# Patient Record
Sex: FEMALE | Race: WHITE | NOT HISPANIC OR LATINO | Employment: FULL TIME | URBAN - METROPOLITAN AREA
[De-identification: names, ages, dates, MRNs, and addresses within clinical notes are randomized per-mention and may not be internally consistent; named-entity substitution may affect disease eponyms.]

---

## 2018-02-14 ENCOUNTER — TRANSFERRED RECORDS (OUTPATIENT)
Dept: HEALTH INFORMATION MANAGEMENT | Facility: CLINIC | Age: 33
End: 2018-02-14

## 2018-02-14 LAB
HPV ABSTRACT: NORMAL
PAP-ABSTRACT: NORMAL

## 2019-03-27 NOTE — PROGRESS NOTES
[id number]    SUBJECTIVE:                                                   Paola Couch is a 34 year old female who presents to clinic today for the following health issue(s):  Patient presents with:  Consult: has been trying for pregnancy for 1.5 years, has increased bleeding and painful periods, painful IC. Has had an HSG for blocked tubes.      HPI: The patient is seen at this time for evaluation of progressive increase in her menses and dysmenorrhea.  She has never been pregnant.  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  Her Paps have been normal since.  She can now bleed up to 5 to 7 days with passage of clots.  She has pain with intercourse exercise full bladder and full colon.  She is not able to use tampons due to pain.      Patient's last menstrual period was 04/05/2019..   Patient is sexually active, No obstetric history on file..  Using none for contraception.    reports that she has quit smoking. She has never used smokeless tobacco.    STD testing offered?  Declined    Health maintenance updated:  yes    Today's PHQ-2 Score: No flowsheet data found.  Today's PHQ-9 Score:   PHQ-9 SCORE 4/8/2019   PHQ-9 Total Score 8     Today's RAUDEL-7 Score:   RAUDEL-7 SCORE 4/8/2019   Total Score 9       Problem list and histories reviewed & adjusted, as indicated.  Additional history: as documented.    There is no problem list on file for this patient.    History reviewed. No pertinent surgical history.   Social History     Tobacco Use     Smoking status: Former Smoker     Smokeless tobacco: Never Used   Substance Use Topics     Alcohol use: Yes      Problem (# of Occurrences) Relation (Name,Age of Onset)    Diabetes (2) Maternal Grandmother, Maternal Grandfather            Current Outpatient Medications   Medication Sig     amphetamine-dextroamphetamine (ADDERALL XR) 15 MG 24 hr capsule Take 15 mg by mouth     Ascorbic Acid (VITAMIN C) 500 MG CAPS      Calcium-Magnesium-Vitamin D 600-300-400  "LIQD      CALCIUM-MAGNESIUM-VITAMIN D PO      Coenzyme Q10 (COQ10 PO)      escitalopram (LEXAPRO) 5 MG tablet Take 5 mg by mouth     Omega-3 Fatty Acids (FISH OIL PO)      Prenatal Vit-Fe Fumarate-FA (PRENATAL VITAMIN PO)      No current facility-administered medications for this visit.      No Known Allergies    ROS:  12 point review of systems negative other than symptoms noted below.  Gastrointestinal: Abdominal Pain, Bloating, Blood in Stools, Diarrhea and Nausea  Genitourinary: Cramps, Heavy Bleeding with Period, Incontinence, Painful [place], Pelvic Pain and Spotting  Psychiatric: Anxiety    OBJECTIVE:     /76   Pulse 68   Ht 1.613 m (5' 3.5\")   Wt 112 kg (247 lb)   LMP 04/05/2019   BMI 43.07 kg/m    Body mass index is 43.07 kg/m .    Exam:  Constitutional:  Appearance: Well nourished, well developed alert, in no acute distress chest is clear to percussion auscultation all fields.  Cardiovascular exam within normal limits with normal S1-S2 no murmur.  Abdominal examination shows truncal obesity.  There are no scars.  Pelvic examination shows heavy menses with large clots at this time.  There is no bladder base tenderness.  The cervix is minimally tender on motion and the uterus is firm.  There is bilateral adnexal tenderness present.  Extremities show no edema.    In-Clinic Test Results: Hysterosalpingogram shows normal uterus with patent left fallopian tube.  A portion of the right fallopian tube opacifies but there is no spill of contrast on the right side. 3/14/2019      ASSESSMENT/PLAN:                                                        34-year-old patient with progressive menorrhagia and dysmenorrhea and extreme tenderness on pelvic examination both at the level of the cervix and the adnexa.  It appears that she may have a blocked right tube and has no history of PID or IUD use.  We have presented the option of further evaluation with ultrasound and/or laparoscopy with laser standby for " treatment of probable stage II-III endometriosis.  And ACOG brochure for laparoscopy was given to the patient.        Boyd Bowles MD  [facility] [place]

## 2019-04-05 ENCOUNTER — TELEPHONE (OUTPATIENT)
Dept: OBGYN | Facility: CLINIC | Age: 34
End: 2019-04-05

## 2019-04-05 NOTE — TELEPHONE ENCOUNTER
Pt questioning if she should cancel her appt for Monday w/ Dr BA.  Pt seeing dr for endometrosis- just got her period today. Advised the pt to call the clinic Monday am If she had a heavy flow. Will discuss with Dr BA.  Pt verbalized understanding.

## 2019-04-08 ENCOUNTER — TELEPHONE (OUTPATIENT)
Dept: OBGYN | Facility: CLINIC | Age: 34
End: 2019-04-08

## 2019-04-08 ENCOUNTER — OFFICE VISIT (OUTPATIENT)
Dept: OBGYN | Facility: CLINIC | Age: 34
End: 2019-04-08
Payer: COMMERCIAL

## 2019-04-08 VITALS
BODY MASS INDEX: 42.17 KG/M2 | HEART RATE: 68 BPM | DIASTOLIC BLOOD PRESSURE: 76 MMHG | WEIGHT: 247 LBS | SYSTOLIC BLOOD PRESSURE: 114 MMHG | HEIGHT: 64 IN

## 2019-04-08 DIAGNOSIS — N97.1 TUBAL OCCLUSION: ICD-10-CM

## 2019-04-08 DIAGNOSIS — E66.01 MORBID OBESITY (H): ICD-10-CM

## 2019-04-08 DIAGNOSIS — R10.2 PELVIC PAIN: Primary | ICD-10-CM

## 2019-04-08 PROCEDURE — 99204 OFFICE O/P NEW MOD 45 MIN: CPT | Performed by: OBSTETRICS & GYNECOLOGY

## 2019-04-08 RX ORDER — DEXTROAMPHETAMINE SACCHARATE, AMPHETAMINE ASPARTATE MONOHYDRATE, DEXTROAMPHETAMINE SULFATE AND AMPHETAMINE SULFATE 3.75; 3.75; 3.75; 3.75 MG/1; MG/1; MG/1; MG/1
15 CAPSULE, EXTENDED RELEASE ORAL EVERY MORNING
COMMUNITY
Start: 2019-03-20 | End: 2023-03-21

## 2019-04-08 RX ORDER — ESCITALOPRAM OXALATE 5 MG/1
10 TABLET ORAL EVERY MORNING
COMMUNITY
Start: 2019-03-20 | End: 2023-10-02 | Stop reason: DRUGHIGH

## 2019-04-08 RX ORDER — MULTIVIT-MIN/IRON/FOLIC ACID/K 18-600-40
CAPSULE ORAL
COMMUNITY
End: 2019-04-29

## 2019-04-08 ASSESSMENT — ANXIETY QUESTIONNAIRES
3. WORRYING TOO MUCH ABOUT DIFFERENT THINGS: SEVERAL DAYS
6. BECOMING EASILY ANNOYED OR IRRITABLE: SEVERAL DAYS
2. NOT BEING ABLE TO STOP OR CONTROL WORRYING: SEVERAL DAYS
7. FEELING AFRAID AS IF SOMETHING AWFUL MIGHT HAPPEN: MORE THAN HALF THE DAYS
5. BEING SO RESTLESS THAT IT IS HARD TO SIT STILL: SEVERAL DAYS
GAD7 TOTAL SCORE: 9
IF YOU CHECKED OFF ANY PROBLEMS ON THIS QUESTIONNAIRE, HOW DIFFICULT HAVE THESE PROBLEMS MADE IT FOR YOU TO DO YOUR WORK, TAKE CARE OF THINGS AT HOME, OR GET ALONG WITH OTHER PEOPLE: SOMEWHAT DIFFICULT
1. FEELING NERVOUS, ANXIOUS, OR ON EDGE: SEVERAL DAYS

## 2019-04-08 ASSESSMENT — MIFFLIN-ST. JEOR: SCORE: 1802.44

## 2019-04-08 ASSESSMENT — PATIENT HEALTH QUESTIONNAIRE - PHQ9
5. POOR APPETITE OR OVEREATING: MORE THAN HALF THE DAYS
SUM OF ALL RESPONSES TO PHQ QUESTIONS 1-9: 8

## 2019-04-08 NOTE — TELEPHONE ENCOUNTER
Type of surgery: LSC C02 LASER  Location of surgery: [facility] OR  Date and time of surgery: 4/30/2019 7:20am ARRIVAL 5:30am  Surgeon: Wilbur  Pre-Op Appt Date: 4/8/2019  Post-Op Appt Date: TBD   Packet sent out: HANDED 4/8/2019  Pre-cert/Authorization completed:  TBD  Date: 4/8/2019 Hamlet hernandez/Odell Hastings  Surgery Scheduler      Order Questions     Question Answer Comment   Procedure name(s) - multi select Laparoscopy with CO2 laser    Is this a multi surgeon case? No    Laterality N/A    Reason for procedure Pelvic pain    Location of Case: [facility] OR    Surgeon Procedure Time (incision to closure) in minutes (per procedure as applicable) 60    Note:  Surgical Case Time Needed (in minutes)   Patient Class (for admit prior to surgery, specify number of days in comments): Same day (hospital outpatient)    Why can t this outpatient surgery be done at the [facility] or [facility]? -    Anesthesia General    Vendor Needed? No

## 2019-04-08 NOTE — Clinical Note
Please abstract the following data from this visit with this patient into the appropriate field in Epic:Pap smear done on this date: 2/14/18 (approximately), by this group: [facility], results were normal, HPV negative. Per care everywhere.

## 2019-04-08 NOTE — NURSING NOTE
Please abstract the following data from this visit with this patient into the appropriate field in Epic:    Pap smear done on this date: 2/14/18 (approximately), by this group: [facility], results were normal, HPV negative. Per care everywhere.

## 2019-04-09 ASSESSMENT — ANXIETY QUESTIONNAIRES: GAD7 TOTAL SCORE: 9

## 2019-04-22 ENCOUNTER — OFFICE VISIT (OUTPATIENT)
Dept: OBGYN | Facility: CLINIC | Age: 34
End: 2019-04-22
Payer: COMMERCIAL

## 2019-04-22 ENCOUNTER — TRANSFERRED RECORDS (OUTPATIENT)
Dept: HEALTH INFORMATION MANAGEMENT | Facility: CLINIC | Age: 34
End: 2019-04-22

## 2019-04-22 VITALS
DIASTOLIC BLOOD PRESSURE: 68 MMHG | WEIGHT: 250.8 LBS | HEIGHT: 64 IN | BODY MASS INDEX: 42.82 KG/M2 | SYSTOLIC BLOOD PRESSURE: 114 MMHG

## 2019-04-22 DIAGNOSIS — M25.559 PAIN IN JOINT INVOLVING PELVIC REGION AND THIGH, UNSPECIFIED LATERALITY: Primary | ICD-10-CM

## 2019-04-22 PROCEDURE — 99213 OFFICE O/P EST LOW 20 MIN: CPT | Performed by: OBSTETRICS & GYNECOLOGY

## 2019-04-22 ASSESSMENT — MIFFLIN-ST. JEOR: SCORE: 1819.68

## 2019-04-22 NOTE — PROGRESS NOTES
SUBJECTIVE:                                                   Paola Couch is a 34 year old female who presents to clinic today for the following health issue(s):  Patient presents with:  Ultrasound: follow up for pelvic pain        HPI: The patient is seen at this time in follow-up of an ultrasound for progressive pelvic pain.  We are concerned about the possibility of endometriosis and possible pelvic adhesive disease with blockage of her right tube by hysterosalpingogram.  She denies any recent colds or fluids.      Patient's last menstrual period was 04/05/2019..   Patient is sexually active, No obstetric history on file..  Using none for contraception.    reports that she has quit smoking. She has never used smokeless tobacco.    STD testing offered?  Declined    Health maintenance updated:  yes    Today's PHQ-2 Score: No flowsheet data found.  Today's PHQ-9 Score:   PHQ-9 SCORE 4/8/2019   PHQ-9 Total Score 8     Today's RAUDEL-7 Score:   RAUDEL-7 SCORE 4/8/2019   Total Score 9       Problem list and histories reviewed & adjusted, as indicated.  Additional history: as documented.    Patient Active Problem List   Diagnosis     Morbid obesity (H)     No past surgical history on file.   Social History     Tobacco Use     Smoking status: Former Smoker     Smokeless tobacco: Never Used   Substance Use Topics     Alcohol use: Yes      Problem (# of Occurrences) Relation (Name,Age of Onset)    Diabetes (2) Maternal Grandmother, Maternal Grandfather            Current Outpatient Medications   Medication Sig     Acetaminophen (TYLENOL PO)      amphetamine-dextroamphetamine (ADDERALL XR) 15 MG 24 hr capsule Take 15 mg by mouth     Ascorbic Acid (VITAMIN C) 500 MG CAPS      Calcium-Magnesium-Vitamin D 600-300-400 LIQD      CALCIUM-MAGNESIUM-VITAMIN D PO      Coenzyme Q10 (COQ10 PO)      escitalopram (LEXAPRO) 5 MG tablet Take 5 mg by mouth     Omega-3 Fatty Acids (FISH OIL PO)      Prenatal Vit-Fe Fumarate-FA (PRENATAL  "VITAMIN PO)      No current facility-administered medications for this visit.      No Known Allergies    ROS:  12 point review of systems negative other than symptoms noted below.    OBJECTIVE:     /68   Ht 1.613 m (5' 3.5\")   Wt 113.8 kg (250 lb 12.8 oz)   LMP 04/05/2019   BMI 43.73 kg/m    Body mass index is 43.73 kg/m .    Exam:  Constitutional:  Appearance: Well nourished, well developed alert, in no acute distress  No Pelvic Exam performed     In-Clinic Test Results: Ultrasound from [facility] shows a uterus of 5.3 x 4.6 x 3.9 cm.  There are no fibroids.  The endometrial stripe is 13 mm thick and normal in appearance.  The right ovary is 3.1 x 2.4 x 1.9 and normal in appearance.  The left ovary is 2.9 x 2.3 x 2.2 cm and normal in appearance.  There is a small amount of clear pelvic free fluid.      ASSESSMENT/PLAN:                                                      Progressive pelvic pain.  Patient is cleared for a laparoscopy with CO2 laser for evaluation treatment of pelvic adhesive disease and possible endometriosis.  The risks and complications have been reviewed and accepted.            Boyd Bowles MD  [facility] [place]  "

## 2019-04-29 RX ORDER — MULTIVIT WITH MINERALS/LUTEIN
1000 TABLET ORAL DAILY
COMMUNITY
End: 2021-06-09

## 2019-04-29 RX ORDER — PHENAZOPYRIDINE HYDROCHLORIDE 200 MG/1
200 TABLET, FILM COATED ORAL ONCE
Status: CANCELLED | OUTPATIENT
Start: 2019-04-29 | End: 2019-04-29

## 2019-04-29 NOTE — H&P
Office Visit     4/22/2019  [facility] Boyd Mata MD       OB/Gyn   Pain in joint involving pelvic region and thigh, unspecified laterality       Dx   Ultrasound         Reason for Visit        Progress Notes              SUBJECTIVE:                                                   Paola Couch is a 34 year old female who presents to clinic today for the following health issue(s):  Patient presents with:  Ultrasound: follow up for pelvic pain           HPI: The patient is seen at this time in follow-up of an ultrasound for progressive pelvic pain.  We are concerned about the possibility of endometriosis and possible pelvic adhesive disease with blockage of her right tube by hysterosalpingogram.  She denies any recent colds or fluids.        Patient's last menstrual period was 04/05/2019..   Patient is sexually active, No obstetric history on file..  Using none for contraception.    reports that she has quit smoking. She has never used smokeless tobacco.     STD testing offered?  Declined     Health maintenance updated:  yes     Today's PHQ-2 Score: No flowsheet data found.  Today's PHQ-9 Score:   PHQ-9 SCORE 4/8/2019   PHQ-9 Total Score 8      Today's RAUDEL-7 Score:   RAUDEL-7 SCORE 4/8/2019   Total Score 9         Problem list and histories reviewed & adjusted, as indicated.  Additional history: as documented.         Patient Active Problem List   Diagnosis     Morbid obesity (H)     No past surgical history on file.   Social History           Tobacco Use     Smoking status: Former Smoker     Smokeless tobacco: Never Used   Substance Use Topics     Alcohol use: Yes       Problem (# of Occurrences) Relation (Name,Age of Onset)     Diabetes (2) Maternal Grandmother, Maternal Grandfather                   Current Outpatient Medications   Medication Sig     Acetaminophen (TYLENOL PO)       amphetamine-dextroamphetamine (ADDERALL XR) 15 MG 24 hr capsule Take 15 mg by mouth     Ascorbic Acid  "(VITAMIN C) 500 MG CAPS       Calcium-Magnesium-Vitamin D 600-300-400 LIQD       CALCIUM-MAGNESIUM-VITAMIN D PO       Coenzyme Q10 (COQ10 PO)       escitalopram (LEXAPRO) 5 MG tablet Take 5 mg by mouth     Omega-3 Fatty Acids (FISH OIL PO)       Prenatal Vit-Fe Fumarate-FA (PRENATAL VITAMIN PO)        No current facility-administered medications for this visit.       No Known Allergies     ROS:  12 point review of systems negative other than symptoms noted below.     OBJECTIVE:      /68   Ht 1.613 m (5' 3.5\")   Wt 113.8 kg (250 lb 12.8 oz)   LMP 04/05/2019   BMI 43.73 kg/m    Body mass index is 43.73 kg/m .     Exam:  Constitutional:  Appearance: Well nourished, well developed alert, in no acute distress  No Pelvic Exam performed      In-Clinic Test Results: Ultrasound from [facility] shows a uterus of 5.3 x 4.6 x 3.9 cm.  There are no fibroids.  The endometrial stripe is 13 mm thick and normal in appearance.  The right ovary is 3.1 x 2.4 x 1.9 and normal in appearance.  The left ovary is 2.9 x 2.3 x 2.2 cm and normal in appearance.  There is a small amount of clear pelvic free fluid.        ASSESSMENT/PLAN:                                                       Progressive pelvic pain.  Patient is cleared for a laparoscopy with CO2 laser for evaluation treatment of pelvic adhesive disease and possible endometriosis.  The risks and complications have been reviewed and accepted.                 Boyd Bowles MD  [facility]          Instructions      After Visit Summary (Printed 4/22/2019)         Additional Documentation     Vitals:    /68      Ht 1.613 m (5' 3.5\")      Wt 113.8 kg (250 lb 12.8 oz)      LMP 04/05/2019      BMI 43.73 kg/m       BSA 2.26 m              More Vitals      Flowsheets:    NICU VS,      Anthropometrics,      Vitals Reassessment         Encounter Info:    Billing Info,      History,      Allergies,      Detailed Report           Media     Orders - " Scan on 4/29/2019 9:58 AM: [facility] - Scan on 4/29/2019 9:58 AM: ULTRASOUND ORDER [facility]      AVS Reports     Date/Time Report Action User   4/22/2019  2:34 PM After Visit Summary Printed Regi Bridges     Encounter Information      Provider Department Encounter # Center   4/22/2019 2:45 PM Boyd Bowles MD  Ob/Gyn [id number] [facility]     Reviewed this Encounter      Medications Problems Allergies History   Boyd Bowles MD   Reviewed Family, Medical, Surgical, Tobacco   Zoraida Arreola CMA   Reviewed Tobacco       Orders Placed      None       Medication Changes          None        Medication List      Visit Diagnoses         Pain in joint involving pelvic region and thigh, unspecified laterality        Problem List

## 2019-04-30 ENCOUNTER — ANESTHESIA (OUTPATIENT)
Dept: SURGERY | Facility: CLINIC | Age: 34
End: 2019-04-30
Payer: COMMERCIAL

## 2019-04-30 ENCOUNTER — HOSPITAL ENCOUNTER (OUTPATIENT)
Facility: CLINIC | Age: 34
Discharge: HOME | End: 2019-04-30
Attending: OBSTETRICS & GYNECOLOGY | Admitting: OBSTETRICS & GYNECOLOGY
Payer: COMMERCIAL

## 2019-04-30 ENCOUNTER — SURGERY (OUTPATIENT)
Age: 34
End: 2019-04-30
Payer: COMMERCIAL

## 2019-04-30 ENCOUNTER — ANESTHESIA EVENT (OUTPATIENT)
Dept: SURGERY | Facility: CLINIC | Age: 34
End: 2019-04-30
Payer: COMMERCIAL

## 2019-04-30 VITALS
RESPIRATION RATE: 12 BRPM | BODY MASS INDEX: 41.13 KG/M2 | WEIGHT: 246.9 LBS | HEART RATE: 73 BPM | HEIGHT: 65 IN | DIASTOLIC BLOOD PRESSURE: 87 MMHG | OXYGEN SATURATION: 97 % | TEMPERATURE: 97.2 F | SYSTOLIC BLOOD PRESSURE: 117 MMHG

## 2019-04-30 DIAGNOSIS — N80.9 ENDOMETRIOSIS: Primary | ICD-10-CM

## 2019-04-30 LAB — B-HCG SERPL-ACNC: <1 IU/L (ref 0–5)

## 2019-04-30 PROCEDURE — 71000012 ZZH RECOVERY PHASE 1 LEVEL 1 FIRST HR: Performed by: OBSTETRICS & GYNECOLOGY

## 2019-04-30 PROCEDURE — 25000125 ZZHC RX 250: Performed by: NURSE ANESTHETIST, CERTIFIED REGISTERED

## 2019-04-30 PROCEDURE — 25000566 ZZH SEVOFLURANE, EA 15 MIN: Performed by: OBSTETRICS & GYNECOLOGY

## 2019-04-30 PROCEDURE — 27210794 ZZH OR GENERAL SUPPLY STERILE: Performed by: OBSTETRICS & GYNECOLOGY

## 2019-04-30 PROCEDURE — 37000009 ZZH ANESTHESIA TECHNICAL FEE, EACH ADDTL 15 MIN: Performed by: OBSTETRICS & GYNECOLOGY

## 2019-04-30 PROCEDURE — 58662 LAPAROSCOPY EXCISE LESIONS: CPT | Performed by: OBSTETRICS & GYNECOLOGY

## 2019-04-30 PROCEDURE — 36415 COLL VENOUS BLD VENIPUNCTURE: CPT | Performed by: OBSTETRICS & GYNECOLOGY

## 2019-04-30 PROCEDURE — 25000128 H RX IP 250 OP 636: Performed by: NURSE ANESTHETIST, CERTIFIED REGISTERED

## 2019-04-30 PROCEDURE — 25000132 ZZH RX MED GY IP 250 OP 250 PS 637: Performed by: OBSTETRICS & GYNECOLOGY

## 2019-04-30 PROCEDURE — 36000063 ZZH SURGERY LEVEL 4 EA 15 ADDTL MIN: Performed by: OBSTETRICS & GYNECOLOGY

## 2019-04-30 PROCEDURE — 25000132 ZZH RX MED GY IP 250 OP 250 PS 637: Performed by: ANESTHESIOLOGY

## 2019-04-30 PROCEDURE — 84702 CHORIONIC GONADOTROPIN TEST: CPT | Performed by: OBSTETRICS & GYNECOLOGY

## 2019-04-30 PROCEDURE — 25000128 H RX IP 250 OP 636: Performed by: OBSTETRICS & GYNECOLOGY

## 2019-04-30 PROCEDURE — 36000093 ZZH SURGERY LEVEL 4 1ST 30 MIN: Performed by: OBSTETRICS & GYNECOLOGY

## 2019-04-30 PROCEDURE — 25800030 ZZH RX IP 258 OP 636: Performed by: ANESTHESIOLOGY

## 2019-04-30 PROCEDURE — 40000170 ZZH STATISTIC PRE-PROCEDURE ASSESSMENT II: Performed by: OBSTETRICS & GYNECOLOGY

## 2019-04-30 PROCEDURE — 71000027 ZZH RECOVERY PHASE 2 EACH 15 MINS: Performed by: OBSTETRICS & GYNECOLOGY

## 2019-04-30 PROCEDURE — 25000128 H RX IP 250 OP 636: Performed by: ANESTHESIOLOGY

## 2019-04-30 PROCEDURE — 71000013 ZZH RECOVERY PHASE 1 LEVEL 1 EA ADDTL HR: Performed by: OBSTETRICS & GYNECOLOGY

## 2019-04-30 PROCEDURE — 25800030 ZZH RX IP 258 OP 636: Performed by: OBSTETRICS & GYNECOLOGY

## 2019-04-30 PROCEDURE — 25800030 ZZH RX IP 258 OP 636: Performed by: NURSE ANESTHETIST, CERTIFIED REGISTERED

## 2019-04-30 PROCEDURE — 37000008 ZZH ANESTHESIA TECHNICAL FEE, 1ST 30 MIN: Performed by: OBSTETRICS & GYNECOLOGY

## 2019-04-30 RX ORDER — LIDOCAINE HYDROCHLORIDE 20 MG/ML
INJECTION, SOLUTION INFILTRATION; PERINEURAL PRN
Status: DISCONTINUED | OUTPATIENT
Start: 2019-04-30 | End: 2019-04-30

## 2019-04-30 RX ORDER — NEOSTIGMINE METHYLSULFATE 1 MG/ML
VIAL (ML) INJECTION PRN
Status: DISCONTINUED | OUTPATIENT
Start: 2019-04-30 | End: 2019-04-30

## 2019-04-30 RX ORDER — FENTANYL CITRATE 50 UG/ML
INJECTION, SOLUTION INTRAMUSCULAR; INTRAVENOUS PRN
Status: DISCONTINUED | OUTPATIENT
Start: 2019-04-30 | End: 2019-04-30

## 2019-04-30 RX ORDER — BUPIVACAINE HYDROCHLORIDE 2.5 MG/ML
INJECTION, SOLUTION EPIDURAL; INFILTRATION; INTRACAUDAL
Status: DISCONTINUED
Start: 2019-04-30 | End: 2019-04-30 | Stop reason: HOSPADM

## 2019-04-30 RX ORDER — PROPOFOL 10 MG/ML
INJECTION, EMULSION INTRAVENOUS PRN
Status: DISCONTINUED | OUTPATIENT
Start: 2019-04-30 | End: 2019-04-30

## 2019-04-30 RX ORDER — SODIUM CHLORIDE, SODIUM LACTATE, POTASSIUM CHLORIDE, CALCIUM CHLORIDE 600; 310; 30; 20 MG/100ML; MG/100ML; MG/100ML; MG/100ML
INJECTION, SOLUTION INTRAVENOUS CONTINUOUS PRN
Status: DISCONTINUED | OUTPATIENT
Start: 2019-04-30 | End: 2019-04-30

## 2019-04-30 RX ORDER — HYDROMORPHONE HYDROCHLORIDE 1 MG/ML
.3-.5 INJECTION, SOLUTION INTRAMUSCULAR; INTRAVENOUS; SUBCUTANEOUS EVERY 10 MIN PRN
Status: DISCONTINUED | OUTPATIENT
Start: 2019-04-30 | End: 2019-04-30 | Stop reason: HOSPADM

## 2019-04-30 RX ORDER — DEXAMETHASONE SODIUM PHOSPHATE 4 MG/ML
INJECTION, SOLUTION INTRA-ARTICULAR; INTRALESIONAL; INTRAMUSCULAR; INTRAVENOUS; SOFT TISSUE PRN
Status: DISCONTINUED | OUTPATIENT
Start: 2019-04-30 | End: 2019-04-30

## 2019-04-30 RX ORDER — OXYCODONE AND ACETAMINOPHEN 5; 325 MG/1; MG/1
1-2 TABLET ORAL EVERY 4 HOURS PRN
Qty: 12 TABLET | Refills: 0 | Status: SHIPPED | OUTPATIENT
Start: 2019-04-30 | End: 2019-05-13

## 2019-04-30 RX ORDER — NALOXONE HYDROCHLORIDE 0.4 MG/ML
.1-.4 INJECTION, SOLUTION INTRAMUSCULAR; INTRAVENOUS; SUBCUTANEOUS
Status: DISCONTINUED | OUTPATIENT
Start: 2019-04-30 | End: 2019-04-30 | Stop reason: HOSPADM

## 2019-04-30 RX ORDER — KETOROLAC TROMETHAMINE 30 MG/ML
30 INJECTION, SOLUTION INTRAMUSCULAR; INTRAVENOUS ONCE
Status: COMPLETED | OUTPATIENT
Start: 2019-04-30 | End: 2019-04-30

## 2019-04-30 RX ORDER — FENTANYL CITRATE 50 UG/ML
25-50 INJECTION, SOLUTION INTRAMUSCULAR; INTRAVENOUS
Status: DISCONTINUED | OUTPATIENT
Start: 2019-04-30 | End: 2019-04-30 | Stop reason: HOSPADM

## 2019-04-30 RX ORDER — HEPARIN SODIUM 1000 [USP'U]/ML
INJECTION, SOLUTION INTRAVENOUS; SUBCUTANEOUS
Status: DISCONTINUED
Start: 2019-04-30 | End: 2019-04-30 | Stop reason: HOSPADM

## 2019-04-30 RX ORDER — BUPIVACAINE HYDROCHLORIDE 2.5 MG/ML
INJECTION, SOLUTION INFILTRATION; PERINEURAL PRN
Status: DISCONTINUED | OUTPATIENT
Start: 2019-04-30 | End: 2019-04-30 | Stop reason: HOSPADM

## 2019-04-30 RX ORDER — OXYCODONE AND ACETAMINOPHEN 5; 325 MG/1; MG/1
1 TABLET ORAL
Status: COMPLETED | OUTPATIENT
Start: 2019-04-30 | End: 2019-04-30

## 2019-04-30 RX ORDER — SODIUM CHLORIDE, SODIUM LACTATE, POTASSIUM CHLORIDE, CALCIUM CHLORIDE 600; 310; 30; 20 MG/100ML; MG/100ML; MG/100ML; MG/100ML
INJECTION, SOLUTION INTRAVENOUS CONTINUOUS
Status: DISCONTINUED | OUTPATIENT
Start: 2019-04-30 | End: 2019-04-30 | Stop reason: HOSPADM

## 2019-04-30 RX ORDER — MEPERIDINE HYDROCHLORIDE 25 MG/ML
12.5 INJECTION INTRAMUSCULAR; INTRAVENOUS; SUBCUTANEOUS
Status: DISCONTINUED | OUTPATIENT
Start: 2019-04-30 | End: 2019-04-30 | Stop reason: HOSPADM

## 2019-04-30 RX ORDER — ACETAMINOPHEN 500 MG
1000 TABLET ORAL ONCE
Status: COMPLETED | OUTPATIENT
Start: 2019-04-30 | End: 2019-04-30

## 2019-04-30 RX ORDER — GLYCOPYRROLATE 0.2 MG/ML
INJECTION, SOLUTION INTRAMUSCULAR; INTRAVENOUS PRN
Status: DISCONTINUED | OUTPATIENT
Start: 2019-04-30 | End: 2019-04-30

## 2019-04-30 RX ORDER — ONDANSETRON 2 MG/ML
INJECTION INTRAMUSCULAR; INTRAVENOUS PRN
Status: DISCONTINUED | OUTPATIENT
Start: 2019-04-30 | End: 2019-04-30

## 2019-04-30 RX ORDER — ONDANSETRON 2 MG/ML
4 INJECTION INTRAMUSCULAR; INTRAVENOUS EVERY 30 MIN PRN
Status: DISCONTINUED | OUTPATIENT
Start: 2019-04-30 | End: 2019-04-30 | Stop reason: HOSPADM

## 2019-04-30 RX ORDER — ONDANSETRON 4 MG/1
4 TABLET, ORALLY DISINTEGRATING ORAL EVERY 30 MIN PRN
Status: DISCONTINUED | OUTPATIENT
Start: 2019-04-30 | End: 2019-04-30 | Stop reason: HOSPADM

## 2019-04-30 RX ADMIN — PROPOFOL 200 MG: 10 INJECTION, EMULSION INTRAVENOUS at 07:23

## 2019-04-30 RX ADMIN — SODIUM CHLORIDE, POTASSIUM CHLORIDE, SODIUM LACTATE AND CALCIUM CHLORIDE: 600; 310; 30; 20 INJECTION, SOLUTION INTRAVENOUS at 08:33

## 2019-04-30 RX ADMIN — HYDROMORPHONE HYDROCHLORIDE 0.5 MG: 1 INJECTION, SOLUTION INTRAMUSCULAR; INTRAVENOUS; SUBCUTANEOUS at 08:44

## 2019-04-30 RX ADMIN — ROCURONIUM BROMIDE 40 MG: 10 INJECTION INTRAVENOUS at 07:23

## 2019-04-30 RX ADMIN — NEOSTIGMINE METHYLSULFATE 5 MG: 1 INJECTION, SOLUTION INTRAVENOUS at 08:00

## 2019-04-30 RX ADMIN — DEXMEDETOMIDINE HYDROCHLORIDE 12 MCG: 100 INJECTION, SOLUTION INTRAVENOUS at 07:30

## 2019-04-30 RX ADMIN — SODIUM CHLORIDE, POTASSIUM CHLORIDE, SODIUM LACTATE AND CALCIUM CHLORIDE: 600; 310; 30; 20 INJECTION, SOLUTION INTRAVENOUS at 09:23

## 2019-04-30 RX ADMIN — ONDANSETRON 4 MG: 2 INJECTION INTRAMUSCULAR; INTRAVENOUS at 07:40

## 2019-04-30 RX ADMIN — MIDAZOLAM 2 MG: 1 INJECTION INTRAMUSCULAR; INTRAVENOUS at 07:23

## 2019-04-30 RX ADMIN — HYDROMORPHONE HYDROCHLORIDE 0.5 MG: 1 INJECTION, SOLUTION INTRAMUSCULAR; INTRAVENOUS; SUBCUTANEOUS at 08:34

## 2019-04-30 RX ADMIN — KETOROLAC TROMETHAMINE 30 MG: 30 INJECTION, SOLUTION INTRAMUSCULAR at 08:31

## 2019-04-30 RX ADMIN — ONDANSETRON 4 MG: 2 INJECTION INTRAMUSCULAR; INTRAVENOUS at 08:21

## 2019-04-30 RX ADMIN — FENTANYL CITRATE 50 MCG: 50 INJECTION, SOLUTION INTRAMUSCULAR; INTRAVENOUS at 08:26

## 2019-04-30 RX ADMIN — LIDOCAINE HYDROCHLORIDE 100 MG: 20 INJECTION, SOLUTION INFILTRATION; PERINEURAL at 07:23

## 2019-04-30 RX ADMIN — DEXMEDETOMIDINE HYDROCHLORIDE 8 MCG: 100 INJECTION, SOLUTION INTRAVENOUS at 07:37

## 2019-04-30 RX ADMIN — SODIUM CHLORIDE, POTASSIUM CHLORIDE, SODIUM LACTATE AND CALCIUM CHLORIDE: 600; 310; 30; 20 INJECTION, SOLUTION INTRAVENOUS at 07:18

## 2019-04-30 RX ADMIN — DEXAMETHASONE SODIUM PHOSPHATE 4 MG: 4 INJECTION, SOLUTION INTRA-ARTICULAR; INTRALESIONAL; INTRAMUSCULAR; INTRAVENOUS; SOFT TISSUE at 07:33

## 2019-04-30 RX ADMIN — HEPARIN SODIUM 1000 ML: 1000 INJECTION, SOLUTION INTRAVENOUS; SUBCUTANEOUS at 07:49

## 2019-04-30 RX ADMIN — FENTANYL CITRATE 50 MCG: 50 INJECTION, SOLUTION INTRAMUSCULAR; INTRAVENOUS at 07:40

## 2019-04-30 RX ADMIN — GLYCOPYRROLATE 1 MG: 0.2 INJECTION, SOLUTION INTRAMUSCULAR; INTRAVENOUS at 08:00

## 2019-04-30 RX ADMIN — MIDAZOLAM HYDROCHLORIDE 1 MG: 1 INJECTION, SOLUTION INTRAMUSCULAR; INTRAVENOUS at 08:55

## 2019-04-30 RX ADMIN — FENTANYL CITRATE 50 MCG: 50 INJECTION, SOLUTION INTRAMUSCULAR; INTRAVENOUS at 08:22

## 2019-04-30 RX ADMIN — OXYCODONE HYDROCHLORIDE AND ACETAMINOPHEN 1 TABLET: 5; 325 TABLET ORAL at 10:10

## 2019-04-30 RX ADMIN — FENTANYL CITRATE 50 MCG: 50 INJECTION, SOLUTION INTRAMUSCULAR; INTRAVENOUS at 07:23

## 2019-04-30 RX ADMIN — BUPIVACAINE HYDROCHLORIDE 18 ML: 2.5 INJECTION, SOLUTION EPIDURAL; INFILTRATION; INTRACAUDAL; PERINEURAL at 08:10

## 2019-04-30 RX ADMIN — ACETAMINOPHEN 1000 MG: 500 TABLET, FILM COATED ORAL at 06:31

## 2019-04-30 ASSESSMENT — ENCOUNTER SYMPTOMS
ORTHOPNEA: 0
SEIZURES: 0

## 2019-04-30 ASSESSMENT — LIFESTYLE VARIABLES: TOBACCO_USE: 1

## 2019-04-30 ASSESSMENT — MIFFLIN-ST. JEOR: SCORE: 1821.84

## 2019-04-30 NOTE — ANESTHESIA POSTPROCEDURE EVALUATION
Patient: Paola Couch    Procedure(s):  LAPAROSCOPY WITH CO2 LASER    Diagnosis:PELVIC PAIN  Diagnosis Additional Information: No value filed.    Anesthesia Type:  General, ETT    Note:  Anesthesia Post Evaluation    Patient location during evaluation: PACU  Patient participation: Able to fully participate in evaluation  Level of consciousness: responsive to verbal stimuli  Pain management: adequate  Airway patency: patent  Cardiovascular status: acceptable  Respiratory status: acceptable  Hydration status: acceptable  PONV: none     Anesthetic complications: None          Last vitals:  Vitals:    04/30/19 0945 04/30/19 1000 04/30/19 1100   BP: 128/69 120/75 117/87   Pulse: 68 61 73   Resp: 10 10 12   Temp: 36.2  C (97.2  F) 36.2  C (97.2  F)    SpO2: 94% 94% 97%         Electronically Signed By: Ermias Tamez MD  April 30, 2019  3:11 PM

## 2019-04-30 NOTE — ANESTHESIA CARE TRANSFER NOTE
Patient: Paola Couch    Procedure(s):  LAPAROSCOPY WITH CO2 LASER    Diagnosis: PELVIC PAIN  Diagnosis Additional Information: No value filed.    Anesthesia Type:   General, ETT     Note:  Airway :Face Mask  Patient transferred to:PACU  Handoff Report: Identifed the Patient, Identified the Reponsible Provider, Reviewed the pertinent medical history, Discussed the surgical course, Reviewed Intra-OP anesthesia mangement and issues during anesthesia, Set expectations for post-procedure period and Allowed opportunity for questions and acknowledgement of understanding      Vitals: (Last set prior to Anesthesia Care Transfer)    CRNA VITALS  4/30/2019 0741 - 4/30/2019 0817      4/30/2019             Resp Rate (observed):  10    Resp Rate (set):  10                Electronically Signed By: DANIE Overton CRNA  April 30, 2019  8:17 AM

## 2019-04-30 NOTE — ANESTHESIA PREPROCEDURE EVALUATION
Anesthesia Pre-Procedure Evaluation    Patient: Paola Couch   MRN: [id number] : 1985          Preoperative Diagnosis: PELVIC PAIN    Procedure(s):  LAPAROSCOPY WITH CO2 LASER    Past Medical History:   Diagnosis Date     Anxiety      HPV in female 2014     Past Surgical History:   Procedure Laterality Date     HEAD & NECK SURGERY  age 15    tooth extraction       Anesthesia Evaluation     . Pt has had prior anesthetic.     No history of anesthetic complications          ROS/MED HX    ENT/Pulmonary: Comment:   Bruxism - wears appliance at night    (+)ASTRID risk factors snores loudly, obese, tobacco use, Past use , . .   (-) sleep apnea and recent URI   Neurologic:      (-) seizures, CVA and migraines   Cardiovascular:        (-) hypertension, CHF and orthopnea/PND   METS/Exercise Tolerance:     Hematologic:  - neg hematologic  ROS       Musculoskeletal:  - neg musculoskeletal ROS       GI/Hepatic:  - neg GI/hepatic ROS      (-) GERD   Renal/Genitourinary:  - ROS Renal section negative       Endo:     (+) Obesity, .   (-) Type II DM and thyroid disease   Psychiatric: Comment: ADD    (+) psychiatric history anxiety      Infectious Disease:  - neg infectious disease ROS       Malignancy:         Other: Comment: Progressive pelvic pain                         Physical Exam  Normal systems: cardiovascular, pulmonary and dental    Airway   Mallampati: I  TM distance: >3 FB  Neck ROM: full    Dental     Cardiovascular   Rhythm and rate: regular and normal      Pulmonary    breath sounds clear to auscultation            No results found for: WBC, HGB, HCT, PLT, CRP, SED, NA, POTASSIUM, CHLORIDE, CO2, BUN, CR, GLC, NITESH, PHOS, MAG, ALBUMIN, PROTTOTAL, ALT, AST, GGT, ALKPHOS, BILITOTAL, BILIDIRECT, LIPASE, AMYLASE, ANN, PTT, INR, FIBR, TSH, T4, T3, HCG, HCGS, CKTOTAL, CKMB, TROPN    Preop Vitals  BP Readings from Last 3 Encounters:   19 123/77   19 114/68   19 114/76    Pulse Readings from Last 3  "Encounters:   04/08/19 68      Resp Readings from Last 3 Encounters:   04/30/19 16    SpO2 Readings from Last 3 Encounters:   04/30/19 97%      Temp Readings from Last 1 Encounters:   04/30/19 36.4  C (97.5  F) (Oral)    Ht Readings from Last 1 Encounters:   04/30/19 1.645 m (5' 4.75\")      Wt Readings from Last 1 Encounters:   04/30/19 112 kg (246 lb 14.4 oz)    Estimated body mass index is 41.4 kg/m  as calculated from the following:    Height as of this encounter: 1.645 m (5' 4.75\").    Weight as of this encounter: 112 kg (246 lb 14.4 oz).       Anesthesia Plan      History & Physical Review  History and physical reviewed and following examination; no interval change.    ASA Status:  2 .    NPO Status:  > 8 hours    Plan for General and ETT with Propofol induction. Maintenance will be TIVA.    PONV prophylaxis:  Ondansetron (or other 5HT-3) and Dexamethasone or Solumedrol  Additional equipment: Videolaryngoscope      Postoperative Care  Postoperative pain management:  IV analgesics and Oral pain medications.      Consents  Anesthetic plan, risks, benefits and alternatives discussed with:  Patient..                 Ermias Tamez MD  "

## 2019-04-30 NOTE — OP NOTE
Procedure Date: 04/30/2019      PREOPERATIVE DIAGNOSIS:  Pelvic pain.      POSTOPERATIVE DIAGNOSIS:  Pelvic pain.      PROCEDURE:  Diagnostic laparoscopy, laser lysis of adhesion, laser vaporization of endometriosis, bilateral ovarian cystotomies.      OPERATIVE FINDINGS:  The patient had 1 broad adhesion from the anterior cul-de-sac to the mid-plane of the right ovary with endometriosis on the ovary.  There were simple cysts and surface endometriosis on both ovaries.  The patient had a large field of endometriosis on the left uterosacral ligament and 2 implants, deep in the cul-de-sac.  There was a 1.5 cm myoma anteriorly.  The distal tubes looked completely normal bilaterally.  Upper abdomen exploration was unremarkable.  Her appendix was visualized and was normal.      OPERATIVE PROCEDURE:  After general anesthesia was induced, the patient was placed in the dorsal lithotomy position and prepped and draped in the usual fashion.  A Hua catheter was placed.  A uterine manipulator was placed.      Through a subumbilical incision, the Veress needle was placed and 3 liters of CO2 were insufflated.  The laparoscope, trocar and sheath were placed without incident.  A 5 mm left lower quadrant trocar was placed through a Marcaine-injected field.  A probe was placed.  The above findings were noted.  The laser scope was brought in and at 15 levine focus beam the adhesion was taken down.  All surface endometriosis on both ovaries and simple cyst were decompressed by laser cystotomy.  All areas of endometriosis in the posterior cul-de-sac were vaporized away to normal retroperitoneal fat.  Upper abdomen exploration was unremarkable.  Copious irrigation was undertaken.  Hemostasis was excellent.  All carbon material was removed by irrigation.  At the conclusion of the case the decision was made to back out.  All gas was exhausted and instruments were removed.  The incisions were closed with 4-0 Vicryl and Steri-Strips.  The  patient went to the recovery room in satisfactory condition.  The estimated blood loss was less than 5 mL.  She will be discharged to home on Percocet as needed for pain.  She is asked to call for any fever, chills, change in bowel or bladder function.  She will return to the office in 2 weeks for a postoperative check.         CHAD MATTHEW JR, MD             D: 2019   T: 2019   MT: JOEL      Name:     ANDREW SHAH   MRN:      [id number]        Account:        [id number]   :      1985           Procedure Date: 2019      Document: [id number]

## 2019-04-30 NOTE — BRIEF OP NOTE
[facility] Brief Operative Note    Pre-operative diagnosis: PELVIC PAIN   Post-operative diagnosis ENDOMETRIOSIS, ADHESIONS   Procedure: Procedure(s):  LAPAROSCOPY WITH CO2 LASER,LYSIS OF PELVIC ADHESIONS, VAPORIZATION OF ENDOMETRIOSIS, OVARIAN CYSTOTOMIES   Surgeon(s): VIPUL   Estimated blood loss: 2 mL    Specimens: NONE   Findings: SEE OP NOTE

## 2019-04-30 NOTE — DISCHARGE INSTRUCTIONS
Today you were given 1,000 mg of Tylenol at 6:30 am. The recommended daily maximum dose is 4000 mg.     Same Day Surgery Discharge Instructions for  Sedation and General Anesthesia       It's not unusual to feel dizzy, light-headed or faint for up to 24 hours after surgery or while taking pain medication.  If you have these symptoms: sit for a few minutes before standing and have someone assist you when you get up to walk or use the bathroom.      You should rest and relax for the next 24 hours. We recommend you make arrangements to have an adult stay with you for at least 24 hours after your discharge.  Avoid hazardous and strenuous activity.      DO NOT DRIVE any vehicle or operate mechanical equipment for 24 hours following the end of your surgery.  Even though you may feel normal, your reactions may be affected by the medication you have received.      Do not drink alcoholic beverages for 24 hours following surgery.       Slowly progress to your regular diet as you feel able. It's not unusual to feel nauseated and/or vomit after receiving anesthesia.  If you develop these symptoms, drink clear liquids (apple juice, ginger ale, broth, 7-up, etc. ) until you feel better.  If your nausea and vomiting persists for 24 hours, please notify your surgeon.        All narcotic pain medications, along with inactivity and anesthesia, can cause constipation. Drinking plenty of liquids and increasing fiber intake will help.      For any questions of a medical nature, call your surgeon.      Do not make important decisions for 24 hours.      If you had general anesthesia, you may have a sore throat for a couple of days related to the breathing tube used during surgery.  You may use Cepacol lozenges to help with this discomfort.  If it worsens or if you develop a fever, contact your surgeon.       If you feel your pain is not well managed with the pain medications prescribed by your surgeon, please contact your surgeon's office  to let them know so they can address your concerns.     Reasons to contact your surgeon:    1. Signs of possible infection: Check your incision daily for redness, swelling, warmth, red streaks or foul drainage.   2. Elevated temperature.  3. Pain not controlled with pain medication and/or rest.   4. Uncontrolled nausea or vomiting.  5. Any questions or concerns.       HOME CARE FOLLOWING LAPAROSCOPY    Diet  You have no restrictions on your diet.  During the evening following surgery, drink plenty of fluids and eat a light supper.    Nausea  The anesthesia may produce some nausea.  If you feel nauseated, stay in bed and try drinking fluids such as 7-Up, tea, or soup.    Discomfort  The amount of discomfort you can expect is very unpredictable.  If you have pain that cannot be controlled with Tylenol or with the prescription you may have received, you should notify your physician.  The following complaints are not uncommon and should not be cause for concern:   1.  Abdominal tenderness; abdominal cramping   2.  Low backache or pain radiating to your shoulders, chest or back. This is a result of the gas used to inflate your abdomen during surgery. Lying flat in bed seems to help relieve this.   3.  Sore throat for a day or two resulting from the anesthesia tube used during surgery.   4.  Black or blue tyler on your abdomen.     Drainage  You may expect a small amount of drainage from the incision on your abdomen and you may change the bandage when necessary.  You will also have a small amount of vaginal drainage for several days; this is normal and no cause for concern.  If excessive bleeding occurs, notify your physician.      If dye was used during your procedure, your urine will initially be bright blue. It will gradually return to yellow throughout the day. Drinking plenty of fluids will help to filter the dye from your urine.    Fever  A low grade fever (not over 100 F) is usual after this procedure.  Do not  hesitate to notify your physician if your fever seems excessive.    Stitches  If your stitches are the type that must be removed, your doctor will instruct you to return to their office.    Activity  Rest on the day of surgery then you may resume your normal activity, as tolerated. Avoid heavy lifting for one week.    You may shower.  Do not douche, and do not use tampons.  If you also had a D&C, do not resume intercourse until bleeding has ceased.            Emergency Care  Contact your physician if you have any of these problems:   1.  A fever over 100 F   2.  A large amount of bleeding or drainage   3.  Severe pain    Today you received Toradol, an antiinflammatory medication similar to Ibuprofen.  You should not take other antiinflammatory medication, such as Ibuprofen, Motrin, Advil, Aleve, Naprosyn, etc until 3pm.          **If you have questions or concerns about your procedure,   call Dr. Bowles at [phone]**

## 2019-05-13 ENCOUNTER — OFFICE VISIT (OUTPATIENT)
Dept: OBGYN | Facility: CLINIC | Age: 34
End: 2019-05-13
Payer: COMMERCIAL

## 2019-05-13 VITALS
WEIGHT: 243 LBS | SYSTOLIC BLOOD PRESSURE: 114 MMHG | DIASTOLIC BLOOD PRESSURE: 68 MMHG | HEART RATE: 84 BPM | BODY MASS INDEX: 40.48 KG/M2 | HEIGHT: 65 IN

## 2019-05-13 DIAGNOSIS — Z09 POSTOP CHECK: Primary | ICD-10-CM

## 2019-05-13 PROCEDURE — 99024 POSTOP FOLLOW-UP VISIT: CPT | Performed by: OBSTETRICS & GYNECOLOGY

## 2019-05-13 ASSESSMENT — MIFFLIN-ST. JEOR: SCORE: 1804.15

## 2019-08-12 NOTE — PROGRESS NOTES
SUBJECTIVE:                                                   Paola Couch is a 34 year old female who presents to clinic today for the following health issue(s):  Patient presents with:  Post-op Visit: 4/30/19: Diagnostic laparoscopy, laser lysis of adhesion, laser vaporization of endometriosis, bilateral ovarian cystotomies      HPI: The patient is seen in follow-up of endometriosis and bilateral ovarian cystotomy's for Endo in April.  She is undergoing letrozole stimulation and IUI's at this time.  An ultrasound showed a possible recurrent endometrioma.  She does have some return of pelvic pain with the.  She is on right now.      Patient's last menstrual period was 08/11/2019..   Patient is sexually active, No obstetric history on file..  Using none for contraception.    reports that she has quit smoking. She has never used smokeless tobacco.    STD testing offered?  Declined    Health maintenance updated:  yes    Today's PHQ-2 Score:   PHQ-2 ( 1999 [employer]) 8/15/2019   Q1: Little interest or pleasure in doing things 1   Q2: Feeling down, depressed or hopeless 2   PHQ-2 Score 3     Today's PHQ-9 Score:   PHQ-9 SCORE 8/15/2019   PHQ-9 Total Score 15     Today's RAUDEL-7 Score:   RAUDEL-7 SCORE 4/8/2019   Total Score 9       Problem list and histories reviewed & adjusted, as indicated.  Additional history: as documented.    Patient Active Problem List   Diagnosis     Morbid obesity (H)     Past Surgical History:   Procedure Laterality Date     HEAD & NECK SURGERY  age 15    tooth extraction     LASER CO2 LAPAROSCOPIC VAPORIZATION ENDOMETRIUM N/A 4/30/2019    Procedure: LAPAROSCOPY WITH CO2 LASER;  Surgeon: Boyd Bowles MD;  Location:  OR      Social History     Tobacco Use     Smoking status: Former Smoker     Smokeless tobacco: Never Used   Substance Use Topics     Alcohol use: Yes     Comment: occasionally      Problem (# of Occurrences) Relation (Name,Age of Onset)    Diabetes (2) Maternal Grandmother,  "Maternal Grandfather            Current Outpatient Medications   Medication Sig     Acetaminophen (TYLENOL PO) Take 1,500 mg by mouth daily as needed      amphetamine-dextroamphetamine (ADDERALL XR) 15 MG 24 hr capsule Take 15 mg by mouth every morning      CALCIUM-MAGNESIUM-ZINC PO Take 1 tablet by mouth daily     cholecalciferol (VITAMIN D3) 5000 units TABS tablet Take 5,000 Units by mouth daily     Coenzyme Q10 (COQ10 PO) Take 100 mg by mouth daily      escitalopram (LEXAPRO) 5 MG tablet Take 5 mg by mouth every morning      EVENING PRIMROSE OIL PO Take 1,300 mg by mouth daily     letrozole (FEMARA) 2.5 MG tablet      magnesium 200 MG TABS Take 200 mg by mouth     Omega-3 Fatty Acids (FISH OIL PO) Take 900 mg by mouth daily      Prenatal Vit-Fe Fumarate-FA (PRENATAL VITAMIN PO) Take 1 tablet by mouth daily [place]     vitamin C (ASCORBIC ACID) 1000 MG TABS Take 1,000 mg by mouth daily     No current facility-administered medications for this visit.      No Known Allergies    ROS:  12 point review of systems negative other than symptoms noted below.  Genitourinary: Cramps, Heavy Bleeding with Period and Pelvic Pain    OBJECTIVE:     /74   Pulse 80   Ht 1.645 m (5' 4.75\")   Wt 112.5 kg (248 lb)   LMP 08/11/2019   BMI 41.59 kg/m    Body mass index is 41.59 kg/m .    Exam:  Constitutional:  Appearance: Well nourished, well developed alert, in no acute distress     In-Clinic Test Results:      ASSESSMENT/PLAN:                                                        Patient with past history of endometriosis status post laparoscopic treatment and ovarian cystectomies.  She will have a follow-up ultrasound to look at follicles on her stimulatory cycles through [facility].  They will forward both ultrasounds to us.  We discussed the options for the patient but she fully understands that she may want to just keep pushing through and attempting pregnancy at this time.  If her endometrioma is in fact valid and " growing we may have to approach this surgically again.          Boyd Bowles MD  [facility] FOR [facility]

## 2019-08-15 ENCOUNTER — OFFICE VISIT (OUTPATIENT)
Dept: OBGYN | Facility: CLINIC | Age: 34
End: 2019-08-15
Payer: COMMERCIAL

## 2019-08-15 VITALS
DIASTOLIC BLOOD PRESSURE: 74 MMHG | HEART RATE: 80 BPM | BODY MASS INDEX: 41.32 KG/M2 | WEIGHT: 248 LBS | HEIGHT: 65 IN | SYSTOLIC BLOOD PRESSURE: 128 MMHG

## 2019-08-15 DIAGNOSIS — N80.109 ENDOMETRIOSIS OF OVARY: Primary | ICD-10-CM

## 2019-08-15 PROCEDURE — 99214 OFFICE O/P EST MOD 30 MIN: CPT | Performed by: OBSTETRICS & GYNECOLOGY

## 2019-08-15 RX ORDER — LETROZOLE 2.5 MG/1
TABLET, FILM COATED ORAL
COMMUNITY
Start: 2019-08-06 | End: 2021-06-09

## 2019-08-15 RX ORDER — MAGNESIUM 200 MG
200 TABLET ORAL
COMMUNITY
Start: 2019-07-31

## 2019-08-15 ASSESSMENT — PATIENT HEALTH QUESTIONNAIRE - PHQ9: SUM OF ALL RESPONSES TO PHQ QUESTIONS 1-9: 15

## 2019-08-15 ASSESSMENT — MIFFLIN-ST. JEOR: SCORE: 1826.83

## 2019-11-12 NOTE — PROGRESS NOTES
SUBJECTIVE:                                                   Paola Couch is a 34 year old female who presents to clinic today for the following health issue(s):  Patient presents with:  Follow Up      HPI: The patient is seen at this time in follow-up of previous treatment for endometriosis.  She is undergone surgical resection.  She is gone through 4 cycles of letrozole/IUI without success.      Patient's last menstrual period was 2019..     Patient is sexually active, .  Using none for contraception.    reports that she has quit smoking. She has never used smokeless tobacco.    STD testing offered?  Declined    Health maintenance updated:  yes    Today's PHQ-2 Score:   PHQ-2 (  [employer]) 8/15/2019   Q1: Little interest or pleasure in doing things 1   Q2: Feeling down, depressed or hopeless 2   PHQ-2 Score 3     Today's PHQ-9 Score:   PHQ-9 SCORE 8/15/2019   PHQ-9 Total Score 15     Today's RAUDEL-7 Score:   RAUDEL-7 SCORE 2019   Total Score 9       Problem list and histories reviewed & adjusted, as indicated.  Additional history: as documented.    Patient Active Problem List   Diagnosis     Morbid obesity (H)     Past Surgical History:   Procedure Laterality Date     HEAD & NECK SURGERY  age 15    tooth extraction     LASER CO2 LAPAROSCOPIC VAPORIZATION ENDOMETRIUM N/A 2019    Procedure: LAPAROSCOPY WITH CO2 LASER;  Surgeon: Boyd Bowles MD;  Location:  OR      Social History     Tobacco Use     Smoking status: Former Smoker     Smokeless tobacco: Never Used   Substance Use Topics     Alcohol use: Yes     Comment: occasionally      Problem (# of Occurrences) Relation (Name,Age of Onset)    Diabetes (2) Maternal Grandmother, Maternal Grandfather            Current Outpatient Medications   Medication Sig     Acetaminophen (TYLENOL PO) Take 1,500 mg by mouth daily as needed      amphetamine-dextroamphetamine (ADDERALL XR) 15 MG 24 hr capsule Take 15 mg by mouth every morning       "CALCIUM-MAGNESIUM-ZINC PO Take 1 tablet by mouth daily     cholecalciferol (VITAMIN D3) 5000 units TABS tablet Take 5,000 Units by mouth daily     Coenzyme Q10 (COQ10 PO) Take 100 mg by mouth daily      escitalopram (LEXAPRO) 5 MG tablet Take 5 mg by mouth every morning      EVENING PRIMROSE OIL PO Take 1,300 mg by mouth daily     letrozole (FEMARA) 2.5 MG tablet      magnesium 200 MG TABS Take 200 mg by mouth     Omega-3 Fatty Acids (FISH OIL PO) Take 900 mg by mouth daily      Prenatal Vit-Fe Fumarate-FA (PRENATAL VITAMIN PO) Take 1 tablet by mouth daily [place]     vitamin C (ASCORBIC ACID) 1000 MG TABS Take 1,000 mg by mouth daily     No current facility-administered medications for this visit.      No Known Allergies    ROS:  12 point review of systems negative other than symptoms noted below.    OBJECTIVE:     /72   Ht 1.645 m (5' 4.75\")   Wt 112.5 kg (248 lb)   LMP 11/02/2019   Breastfeeding No   BMI 41.59 kg/m    Body mass index is 41.59 kg/m .    Exam:  Constitutional:  Appearance: Well nourished, well developed alert, in no acute distress  Neck:  Lymph Nodes:  No lymphadenopathy present; Thyroid:  Gland size normal, nontender, no nodules or masses present on palpation  Chest:  Respiratory Effort:  Breathing unlabored. Clear to auscultation bilaterally.   Cardiovascular: Heart: Auscultation:  Regular rate, normal rhythm, no murmurs present  Gastrointestinal:  Abdominal Examination:  Abdomen nontender to palpation, tone normal without rigidity or guarding, no masses present, umbilicus without lesions; Liver/Spleen:  No hepatomegaly present, liver nontender to palpation; Hernias:  No hernias present  Lymphatic: Lymph Nodes:  No other lymphadenopathy present  Skin: General Inspection:  No rashes present, no lesions present, no areas of discoloration.  Neurologic:  Mental Status:  Oriented X3.  Normal strength and tone, sensory exam grossly normal, mentation intact and speech normal.  "   Psychiatric:  Mentation appears normal and affect normal/bright.  Pelvic Exam:  External Genitalia:     Normal appearance for age, no discharge present, no tenderness present, no inflammatory lesions present, color normal  Vagina:     Normal vaginal vault without central or paravaginal defects, no discharge present, no inflammatory lesions present, no masses present  Bladder:     Nontender to palpation  Urethra:   Urethral Body:  Urethra palpation normal, urethra structural support normal   Urethral Meatus:  No erythema or lesions present  Cervix:     Appearance healthy, no lesions present, nontender to palpation, no bleeding present  Uterus:     Uterus: firm, normal sized and nontender, midplane in position.   Adnexa:     No adnexal tenderness present, no adnexal masses present  Perineum:     Perineum within normal limits, no evidence of trauma, no rashes or skin lesions present  Anus:     Anus within normal limits, no hemorrhoids present  Inguinal Lymph Nodes:     No lymphadenopathy present  Pubic Hair:     Normal pubic hair distribution for age  Genitalia and Groin:     No rashes present, no lesions present, no areas of discoloration, no masses present       In-Clinic Test Results:      ASSESSMENT/PLAN:                                                        Patient has a past history of endometriosis but has had no dyspareunia and has a very benign exam at this point.  They are going to try one last cycle of IUI and if this is not successful go on continuous suppression birth control pills until they can save enough money for IVF.          Boyd Bowles MD  [facility] FOR [facility] [place]

## 2019-11-13 ENCOUNTER — OFFICE VISIT (OUTPATIENT)
Dept: OBGYN | Facility: CLINIC | Age: 34
End: 2019-11-13
Payer: COMMERCIAL

## 2019-11-13 VITALS
DIASTOLIC BLOOD PRESSURE: 72 MMHG | HEIGHT: 65 IN | WEIGHT: 248 LBS | SYSTOLIC BLOOD PRESSURE: 124 MMHG | BODY MASS INDEX: 41.32 KG/M2

## 2019-11-13 DIAGNOSIS — N80.9 ENDOMETRIOSIS: Primary | ICD-10-CM

## 2019-11-13 PROCEDURE — 99213 OFFICE O/P EST LOW 20 MIN: CPT | Performed by: OBSTETRICS & GYNECOLOGY

## 2019-11-13 RX ORDER — NORETHINDRONE ACETATE AND ETHINYL ESTRADIOL 1MG-20(21)
1 KIT ORAL DAILY
Qty: 84 TABLET | Refills: 3 | Status: SHIPPED | OUTPATIENT
Start: 2019-11-13 | End: 2020-07-28

## 2019-11-13 ASSESSMENT — MIFFLIN-ST. JEOR: SCORE: 1826.83

## 2020-03-10 ENCOUNTER — HEALTH MAINTENANCE LETTER (OUTPATIENT)
Age: 35
End: 2020-03-10

## 2020-07-28 DIAGNOSIS — N80.9 ENDOMETRIOSIS: ICD-10-CM

## 2020-07-28 RX ORDER — NORETHINDRONE ACETATE AND ETHINYL ESTRADIOL 1MG-20(21)
1 KIT ORAL DAILY
Qty: 112 TABLET | Refills: 0 | Status: SHIPPED | OUTPATIENT
Start: 2020-07-28 | End: 2020-10-26

## 2020-07-28 NOTE — TELEPHONE ENCOUNTER
Prescription approved per [facility] Refill Protocol.  On continuous suppression.    Karina Falcon RN on 7/28/2020 at 8:33 AM

## 2020-07-28 NOTE — TELEPHONE ENCOUNTER
"Requested Prescriptions   Pending Prescriptions Disp Refills     norethindrone-ethinyl estradiol (JUNEL FE 1/20) 1-20 MG-MCG tablet 84 tablet 3     Sig: Take 1 tablet by mouth daily 1 active pill daily for continuous suppression       Contraceptives Protocol Passed - 7/28/2020  8:26 AM        Passed - Patient is not a current smoker if age is 35 or older        Passed - Recent (12 mo) or future (30 days) visit within the authorizing provider's specialty     Patient has had an office visit with the authorizing provider or a provider within the authorizing providers department within the previous 12 mos or has a future within next 30 days. See \"Patient Info\" tab in inbasket, or \"Choose Columns\" in Meds & Orders section of the refill encounter.              Passed - Medication is active on med list        Passed - No active pregnancy on record        Passed - No positive pregnancy test in past 12 months           Last Written Prescription Date:  11/13/2019  Last Fill Quantity: 84,  # refills: 3   Last office visit: 11/13/2019 with prescribing provider:  Boyd Bowles   Future Office Visit:   Next 5 appointments (look out 90 days)    Aug 17, 2020  2:15 PM CDT  SHORT with Boyd Bowles MD  [facility] [place] ([facility] [place]) [address] [place] [id number] [phone]                 "

## 2020-10-25 DIAGNOSIS — N80.9 ENDOMETRIOSIS: ICD-10-CM

## 2020-10-26 RX ORDER — NORETHINDRONE ACETATE AND ETHINYL ESTRADIOL AND FERROUS FUMARATE 1MG-20(21)
KIT ORAL
Qty: 112 TABLET | Refills: 0 | Status: SHIPPED | OUTPATIENT
Start: 2020-10-26 | End: 2021-12-08

## 2020-10-26 NOTE — TELEPHONE ENCOUNTER
"Requested Prescriptions   Pending Prescriptions Disp Refills     JUNEL FE 1/20 1-20 MG-MCG tablet [Pharmacy Med Name: JUNEL FE 1 MG-20 MCG TABLET] 112 tablet 0     Sig: TAKE 1 ACTIVE TABLET BY MOUTH DAILY FOR CONTINUOUS SUPPRESSION       Contraceptives Protocol Passed - 10/25/2020  9:42 AM        Passed - Patient is not a current smoker if age is 35 or older        Passed - Recent (12 mo) or future (30 days) visit within the authorizing provider's specialty     Patient has had an office visit with the authorizing provider or a provider within the authorizing providers department within the previous 12 mos or has a future within next 30 days. See \"Patient Info\" tab in inbasket, or \"Choose Columns\" in Meds & Orders section of the refill encounter.              Passed - Medication is active on med list        Passed - No active pregnancy on record        Passed - No positive pregnancy test in past 12 months           Last Written Prescription Date:  7/28/20  Last Fill Quantity: 112,  # refills: 0   Last office visit: 11/13/2019 with prescribing provider:  Dr martins   Future Office Visit:  none    Medication is being filled for 1 time refill only due to:  appointment needed for further refills   Janice Spencer RN on 10/26/2020 at 9:08 AM          "

## 2020-12-27 ENCOUNTER — HEALTH MAINTENANCE LETTER (OUTPATIENT)
Age: 35
End: 2020-12-27

## 2021-01-15 DIAGNOSIS — N80.9 ENDOMETRIOSIS: ICD-10-CM

## 2021-01-15 RX ORDER — NORETHINDRONE ACETATE AND ETHINYL ESTRADIOL 1MG-20(21)
KIT ORAL
Qty: 112 TABLET | Refills: 0 | OUTPATIENT
Start: 2021-01-15

## 2021-01-15 NOTE — TELEPHONE ENCOUNTER
"Requested Prescriptions   Pending Prescriptions Disp Refills     norethindrone-ethinyl estradiol (JUNEL FE 1/20) 1-20 MG-MCG tablet 112 tablet 0       Contraceptives Protocol Failed - 1/15/2021  9:46 AM        Failed - Recent (12 mo) or future (30 days) visit within the authorizing provider's specialty     Patient has had an office visit with the authorizing provider or a provider within the authorizing providers department within the previous 12 mos or has a future within next 30 days. See \"Patient Info\" tab in inbasket, or \"Choose Columns\" in Meds & Orders section of the refill encounter.              Passed - Patient is not a current smoker if age is 35 or older        Passed - Medication is active on med list        Passed - No active pregnancy on record        Passed - No positive pregnancy test in past 12 months           Last Written Prescription Date:  10/26/2020  Last Fill Quantity: 112,  # refills: 0   Last office visit: 11/13/2019 with prescribing provider:  Dr. Bowles   Future Office Visit:    Pt due for annual, no appt scheduled. Pt already received one month extension. Rx denied.   Janice Spencer RN on 1/15/2021 at 10:11 AM    "

## 2021-04-24 ENCOUNTER — HEALTH MAINTENANCE LETTER (OUTPATIENT)
Age: 36
End: 2021-04-24

## 2021-06-09 ENCOUNTER — OFFICE VISIT (OUTPATIENT)
Dept: OBGYN | Facility: CLINIC | Age: 36
End: 2021-06-09
Payer: COMMERCIAL

## 2021-06-09 VITALS
DIASTOLIC BLOOD PRESSURE: 64 MMHG | HEIGHT: 65 IN | SYSTOLIC BLOOD PRESSURE: 102 MMHG | WEIGHT: 240.2 LBS | BODY MASS INDEX: 40.02 KG/M2

## 2021-06-09 DIAGNOSIS — N80.9 ENDOMETRIOSIS: Primary | ICD-10-CM

## 2021-06-09 PROCEDURE — 99213 OFFICE O/P EST LOW 20 MIN: CPT | Performed by: OBSTETRICS & GYNECOLOGY

## 2021-06-09 RX ORDER — METFORMIN HCL 500 MG
1500 TABLET, EXTENDED RELEASE 24 HR ORAL AT BEDTIME
COMMUNITY
Start: 2021-05-27 | End: 2022-10-14

## 2021-06-09 ASSESSMENT — MIFFLIN-ST. JEOR: SCORE: 1781.45

## 2021-06-09 NOTE — PROGRESS NOTES
SUBJECTIVE:                                                   Paola Couch is a 36 year old female who presents to clinic today for the following health issue(s):  Patient presents with:  Fertility: Pt has a hx of endometriosis and would like to discuss fertility.        HPI: 36-year-old patient with a past history of endometriosis who returns at this time with increased pain.  She has been off any suppression for the last year and a half and has been attempting pregnancy on her own.  She is cycling regularly and is ovulatory.  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  They are contemplating further work-up this summer for any recurrent disease.  They are unsure of timing of any other assisted reproductive techniques this .      Patient's last menstrual period was 2021 (approximate)..     Patient is sexually active, .  Using oral contraceptives for contraception.    reports that she has quit smoking. She has never used smokeless tobacco.    STD testing offered?  Declined    Health maintenance updated:  yes    Today's PHQ-2 Score:   PHQ-2 (  [employer]) 2021   Q1: Little interest or pleasure in doing things 0   Q2: Feeling down, depressed or hopeless 0   PHQ-2 Score 0     Today's PHQ-9 Score:   PHQ-9 SCORE 8/15/2019   PHQ-9 Total Score 15     Today's RAUDEL-7 Score:   RAUDEL-7 SCORE 2019   Total Score 9       Problem list and histories reviewed & adjusted, as indicated.  Additional history: as documented.    Patient Active Problem List   Diagnosis     Morbid obesity (H)     Past Surgical History:   Procedure Laterality Date     HEAD & NECK SURGERY  age 15    tooth extraction     LASER CO2 LAPAROSCOPIC VAPORIZATION ENDOMETRIUM N/A 2019    Procedure: LAPAROSCOPY WITH CO2 LASER;  Surgeon: Boyd Bowles MD;  Location:  OR      Social History     Tobacco Use     Smoking status: Former Smoker     Smokeless tobacco: Never Used   Substance Use Topics     Alcohol use: Yes      "Comment: occasionally      Problem (# of Occurrences) Relation (Name,Age of Onset)    Diabetes (2) Maternal Grandmother, Maternal Grandfather            Current Outpatient Medications   Medication Sig     amphetamine-dextroamphetamine (ADDERALL XR) 15 MG 24 hr capsule Take 15 mg by mouth every morning      CALCIUM-MAGNESIUM-ZINC PO Take 1 tablet by mouth daily     cholecalciferol (VITAMIN D3) 5000 units TABS tablet Take 5,000 Units by mouth daily     Coenzyme Q10 (COQ10 PO) Take 100 mg by mouth daily      escitalopram (LEXAPRO) 5 MG tablet Take 10 mg by mouth every morning      JUNEL FE 1/20 1-20 MG-MCG tablet TAKE 1 ACTIVE TABLET BY MOUTH DAILY FOR CONTINUOUS SUPPRESSION     magnesium 200 MG TABS Take 200 mg by mouth     metFORMIN (GLUCOPHAGE-XR) 500 MG 24 hr tablet Take 500 mg by mouth     Omega-3 Fatty Acids (FISH OIL PO) Take 900 mg by mouth daily      Prenatal Vit-Fe Fumarate-FA (PRENATAL VITAMIN PO) Take 1 tablet by mouth daily [place]     Acetaminophen (TYLENOL PO) Take 1,500 mg by mouth daily as needed      No current facility-administered medications for this visit.      No Known Allergies    ROS:  12 point review of systems negative other than symptoms noted below or in the HPI.  No urinary frequency or dysuria, bladder or kidney problems      OBJECTIVE:     /64   Ht 1.645 m (5' 4.75\")   Wt 109 kg (240 lb 3.2 oz)   LMP 06/01/2021 (Approximate)   Breastfeeding No   BMI 40.28 kg/m    Body mass index is 40.28 kg/m .    Exam:  Constitutional:  Appearance: Well nourished, well developed alert, in no acute distress  Neurologic:  Mental Status:  Oriented X3.  Normal strength and tone, sensory exam grossly normal, mentation intact and speech normal.    Psychiatric:  Mentation appears normal and affect normal/bright.  No Pelvic Exam performed     In-Clinic Test Results:      ASSESSMENT/PLAN:                                                        ICD-10-CM    1. Endometriosis  N80.9      The patient " is seen at this time for a consultative visit about possible return of her endometriosis and fertility issues going forward.  She is working with KENNETH SANTOS in [facility].  She denies any examination today but agrees that a updated vaginal probe ultrasound would be an appropriate next step.  She will return for this exam here.          Boyd Bowles MD  [facility] [place]

## 2021-06-22 NOTE — PROGRESS NOTES
SUBJECTIVE:                                                   Paola Couch is a 36 year old female who presents to clinic today for the following health issue(s):  Patient presents with:  Ultrasound      HPI: The patient is seen at this time in follow-up of endometriosis.  She had previous surgery and then went a fairly long period of time with no suppression.  She has had increased pain and is back on suppression at this time.  Her ultimate goal is to have a stem cycle with a retrieval in September and then a possible pelvic cleanout prior to reimplantation.      Patient's last menstrual period was 2021 (approximate)..     Patient is sexually active, .  Using oral contraceptives for contraception.    reports that she has quit smoking. She has never used smokeless tobacco.    STD testing offered?  Declined    Health maintenance updated:  no, due for pap smear.     Today's PHQ-2 Score:   PHQ-2 (  [employer]) 2021   Q1: Little interest or pleasure in doing things 0   Q2: Feeling down, depressed or hopeless 0   PHQ-2 Score 0     Today's PHQ-9 Score:   PHQ-9 SCORE 8/15/2019   PHQ-9 Total Score 15     Today's RAUDEL-7 Score:   RAUDEL-7 SCORE 2019   Total Score 9       Problem list and histories reviewed & adjusted, as indicated.  Additional history: as documented.    Patient Active Problem List   Diagnosis     Morbid obesity (H)     Past Surgical History:   Procedure Laterality Date     HEAD & NECK SURGERY  age 15    tooth extraction     LASER CO2 LAPAROSCOPIC VAPORIZATION ENDOMETRIUM N/A 2019    Procedure: LAPAROSCOPY WITH CO2 LASER;  Surgeon: Boyd Bowles MD;  Location:  OR      Social History     Tobacco Use     Smoking status: Former Smoker     Smokeless tobacco: Never Used   Substance Use Topics     Alcohol use: Yes     Comment: occasionally      Problem (# of Occurrences) Relation (Name,Age of Onset)    Diabetes (2) Maternal Grandmother, Maternal Grandfather            Current  Outpatient Medications   Medication Sig     Acetaminophen (TYLENOL PO) Take 1,500 mg by mouth daily as needed      amphetamine-dextroamphetamine (ADDERALL XR) 15 MG 24 hr capsule Take 15 mg by mouth every morning      CALCIUM-MAGNESIUM-ZINC PO Take 1 tablet by mouth daily     cholecalciferol (VITAMIN D3) 5000 units TABS tablet Take 5,000 Units by mouth daily     Coenzyme Q10 (COQ10 PO) Take 100 mg by mouth daily      escitalopram (LEXAPRO) 5 MG tablet Take 10 mg by mouth every morning      JUNEL FE 1/20 1-20 MG-MCG tablet TAKE 1 ACTIVE TABLET BY MOUTH DAILY FOR CONTINUOUS SUPPRESSION     magnesium 200 MG TABS Take 200 mg by mouth     metFORMIN (GLUCOPHAGE-XR) 500 MG 24 hr tablet Take 500 mg by mouth     Omega-3 Fatty Acids (FISH OIL PO) Take 900 mg by mouth daily      Prenatal Vit-Fe Fumarate-FA (PRENATAL VITAMIN PO) Take 1 tablet by mouth daily [place] [facility]     No current facility-administered medications for this visit.      No Known Allergies    ROS:  12 point review of systems negative other than symptoms noted below or in the HPI.  No urinary frequency or dysuria, bladder or kidney problems      OBJECTIVE:     LMP 06/01/2021 (Approximate)   There is no height or weight on file to calculate BMI.    Exam:  Constitutional:  Appearance: Well nourished, well developed alert, in no acute distress  Neurologic:  Mental Status:  Oriented X3.  Normal strength and tone, sensory exam grossly normal, mentation intact and speech normal.    Psychiatric:  Mentation appears normal and affect normal/bright.     In-Clinic Test Results:  Results for orders placed or performed in visit on 06/23/21   US Transvaginal Non OB     Status: None    Narrative    US Transvaginal Non OB  Order #: [id number] Accession #: [id number]  Study Notes     Margo Reilly on 6/23/2021  4:17 PM      Gynecological Ultrasound Report  Pelvic U/S - Transvaginal  [facility]  Referring Provider: Dr. Boyd Bowles  Sonographer:  Margo  FRANKIE Reilly  Indication: Endometriosis  LMP: 06/18/21  History:   Gynecological Ultrasonography:   Uterus: retroverted. Contour is irregular w/ myomata: 1 Right Posterior   3.4 x 2.1 x 2.7 cm.  Size: 5.95 x 4.23 x 3.35 cm  Endometrium: Thickness Total 6.57 mm  Findings: Heterogenous  Right Ovary: 3.17 x 3.42 x 3.41 cm. Simple cyst 2.3 x 2.1 x 1.6 cm  Left Ovary: 3.26 x 2.40 x 1.45 cm. Wnl  Cul de Sac Free Fluid: No free fluid     Impression: Simple cyst right ovary, small uterine myoma                 Kin         ASSESSMENT/PLAN:                                                        ICD-10-CM    1. Myoma  D21.9        The patient will need an SIS in August and she will call to schedule this.  We have recommended that she stay on continuous suppression at this time.  She understands that when she goes for stimulation she may have increased pain.      Boyd Bowles MD  [facility] [place]

## 2021-06-23 ENCOUNTER — ANCILLARY PROCEDURE (OUTPATIENT)
Dept: ULTRASOUND IMAGING | Facility: CLINIC | Age: 36
End: 2021-06-23
Payer: COMMERCIAL

## 2021-06-23 ENCOUNTER — OFFICE VISIT (OUTPATIENT)
Dept: OBGYN | Facility: CLINIC | Age: 36
End: 2021-06-23
Payer: COMMERCIAL

## 2021-06-23 DIAGNOSIS — N80.9 ENDOMETRIOSIS: ICD-10-CM

## 2021-06-23 DIAGNOSIS — D21.9 MYOMA: Primary | ICD-10-CM

## 2021-06-23 PROCEDURE — 76830 TRANSVAGINAL US NON-OB: CPT | Performed by: OBSTETRICS & GYNECOLOGY

## 2021-06-23 PROCEDURE — 99213 OFFICE O/P EST LOW 20 MIN: CPT | Performed by: OBSTETRICS & GYNECOLOGY

## 2021-08-03 ENCOUNTER — TELEPHONE (OUTPATIENT)
Dept: OBGYN | Facility: CLINIC | Age: 36
End: 2021-08-03

## 2021-08-03 DIAGNOSIS — Z11.59 ENCOUNTER FOR SCREENING FOR OTHER VIRAL DISEASES: ICD-10-CM

## 2021-08-03 DIAGNOSIS — N80.9 ENDOMETRIOSIS: Primary | ICD-10-CM

## 2021-08-03 NOTE — TELEPHONE ENCOUNTER
Patient asked to schedule a [facility] Sonogram with Dr. Bowles as discussed at her last appointment. No order in appointment desk. Okay to schedule?    Addendum:  OV 6/23/21:  The patient will need an SIS in August and she will call to schedule this    SIS order placed.    Routing to  to assist with appt.    Carola Rocha RN on 8/3/2021 at 9:51 AM

## 2021-08-31 ENCOUNTER — ANCILLARY PROCEDURE (OUTPATIENT)
Dept: ULTRASOUND IMAGING | Facility: CLINIC | Age: 36
End: 2021-08-31
Attending: OBSTETRICS & GYNECOLOGY
Payer: COMMERCIAL

## 2021-08-31 ENCOUNTER — OFFICE VISIT (OUTPATIENT)
Dept: OBGYN | Facility: CLINIC | Age: 36
End: 2021-08-31
Attending: OBSTETRICS & GYNECOLOGY
Payer: COMMERCIAL

## 2021-08-31 DIAGNOSIS — Z01.812 PRE-PROCEDURE LAB EXAM: ICD-10-CM

## 2021-08-31 DIAGNOSIS — D21.9 MYOMA: Primary | ICD-10-CM

## 2021-08-31 DIAGNOSIS — N80.9 ENDOMETRIOSIS: ICD-10-CM

## 2021-08-31 DIAGNOSIS — Z01.812 PRE-PROCEDURE LAB EXAM: Primary | ICD-10-CM

## 2021-08-31 LAB — HCG UR QL: NEGATIVE

## 2021-08-31 PROCEDURE — 76831 ECHO EXAM UTERUS: CPT | Performed by: OBSTETRICS & GYNECOLOGY

## 2021-08-31 PROCEDURE — 58340 CATHETER FOR HYSTEROGRAPHY: CPT | Performed by: OBSTETRICS & GYNECOLOGY

## 2021-08-31 PROCEDURE — 81025 URINE PREGNANCY TEST: CPT | Performed by: OBSTETRICS & GYNECOLOGY

## 2021-08-31 PROCEDURE — 99207 PR NO CHARGE LOS: CPT | Performed by: OBSTETRICS & GYNECOLOGY

## 2021-08-31 NOTE — PROGRESS NOTES
The patient is seen at this time for an SIS.  She has a known posterior pedunculated fibroid that was approximately 3 cm in June.  Ultrasound is performed at this time with no change in the fibroid.  Informed consent was obtained.  The patient was placed in the dorsolithotomy position and prepped.  A cannula was placed to the cervix and under ultrasound guidance fluid instilled.  The cavity was normal.  The patient tolerated this well and findings were reviewed with her carefully.  She will proceed with her ART this fall and then come back for discussion of surgery.

## 2021-10-09 ENCOUNTER — HEALTH MAINTENANCE LETTER (OUTPATIENT)
Age: 36
End: 2021-10-09

## 2021-11-01 ENCOUNTER — TELEPHONE (OUTPATIENT)
Dept: OBGYN | Facility: CLINIC | Age: 36
End: 2021-11-01
Payer: COMMERCIAL

## 2021-11-01 ENCOUNTER — PREP FOR PROCEDURE (OUTPATIENT)
Dept: OBGYN | Facility: CLINIC | Age: 36
End: 2021-11-01

## 2021-11-01 DIAGNOSIS — Z11.59 ENCOUNTER FOR SCREENING FOR OTHER VIRAL DISEASES: ICD-10-CM

## 2021-11-01 DIAGNOSIS — D21.9 MYOMA: Primary | ICD-10-CM

## 2021-11-01 NOTE — TELEPHONE ENCOUNTER
Type of surgery: LSC MYOMECTOMY  Location of surgery: [facility] OR  Date and time of surgery: 11/9/2021 10:30a  Surgeon: VIPUL  Pre-Op Appt Date: 11/4/2021  Post-Op Appt Date: 12/8/2021 12:45p   Packet sent out: HANDED AT [facility] BY MA ON 11/4/2021  Pre-cert/Authorization completed:  TBD  Date: 11/1/2021 [place] w/Regi VIEYRA TEST 11/6/2021 11:30a RADHA Nieto  Surgery Scheduler    CPT 74639          SAME DAY/OBSERVATION/INPATIENT: STRAIGHT SAME DAY  EQUIPMENT:   TIME OFF WORK: 7 day  ERAS BAG GIVEN No  ERAS INSTRUCTIONS EXPLAINED No  ESTIMATED DOCTOR TIME IN MINUTES 60  POST OP TO BE SCHEDULE 2 WEEKS AFTER SX

## 2021-11-01 NOTE — PROGRESS NOTES
SUBJECTIVE:                                                   Paola Couch is a 36 year old female who presents to clinic today for the following health issue(s):  Patient presents with:  Pre-Op Exam: 21: Laparoscopic Myomectomy      HPI: The patient is a 36-year-old with a past history of pelvic pain endometriosis and a known pedunculated uterine fibroid.  She has arrived at a point that is not tenable for pain control and is requesting laparoscopy with clean up and possible myomectomy if the fibroid is easily amenable to approach.  She is healthy at this time.  She understands the risk and complications of the procedure.    No LMP recorded. (Menstrual status: Birth Control)..     Patient is sexually active, .  Using oral contraceptives for contraception.    reports that she has quit smoking. She has never used smokeless tobacco.    STD testing offered?  Declined    Health maintenance updated:  yes    Today's PHQ-2 Score:   PHQ-2 (  [employer]) 2021   Q1: Little interest or pleasure in doing things 0   Q2: Feeling down, depressed or hopeless 0   PHQ-2 Score 0     Today's PHQ-9 Score:   PHQ-9 SCORE 8/15/2019   PHQ-9 Total Score 15     Today's RAUDEL-7 Score:   RAUDEL-7 SCORE 2019   Total Score 9       Problem list and histories reviewed & adjusted, as indicated.  Additional history: as documented.    Patient Active Problem List   Diagnosis     Morbid obesity (H)     Past Surgical History:   Procedure Laterality Date     HEAD & NECK SURGERY  age 15    tooth extraction     LASER CO2 LAPAROSCOPIC VAPORIZATION ENDOMETRIUM N/A 2019    Procedure: LAPAROSCOPY WITH CO2 LASER;  Surgeon: Boyd Bowles MD;  Location:  OR      Social History     Tobacco Use     Smoking status: Former Smoker     Smokeless tobacco: Never Used   Substance Use Topics     Alcohol use: Yes     Comment: occasionally      Problem (# of Occurrences) Relation (Name,Age of Onset)    Diabetes (2) Maternal Grandmother,  "Maternal Grandfather            Current Outpatient Medications   Medication Sig     Acetaminophen (TYLENOL PO) Take 1,500 mg by mouth daily as needed      amphetamine-dextroamphetamine (ADDERALL XR) 15 MG 24 hr capsule Take 15 mg by mouth every morning      CALCIUM-MAGNESIUM-ZINC PO Take 1 tablet by mouth daily     cholecalciferol (VITAMIN D3) 5000 units TABS tablet Take 5,000 Units by mouth daily     Coenzyme Q10 (COQ10 PO) Take 100 mg by mouth daily      escitalopram (LEXAPRO) 5 MG tablet Take 10 mg by mouth every morning      JUNEL FE 1/20 1-20 MG-MCG tablet TAKE 1 ACTIVE TABLET BY MOUTH DAILY FOR CONTINUOUS SUPPRESSION     magnesium 200 MG TABS Take 200 mg by mouth     metFORMIN (GLUCOPHAGE-XR) 500 MG 24 hr tablet Take 500 mg by mouth     Omega-3 Fatty Acids (FISH OIL PO) Take 900 mg by mouth daily      Prenatal Vit-Fe Fumarate-FA (PRENATAL VITAMIN PO) Take 1 tablet by mouth daily [place] [facility]     No current facility-administered medications for this visit.     No Known Allergies    ROS:  12 point review of systems negative other than symptoms noted below or in the HPI.  No urinary frequency or dysuria, bladder or kidney problems      OBJECTIVE:     /76   Pulse 76   Ht 1.645 m (5' 4.75\")   Wt 108.4 kg (239 lb)   BMI 40.08 kg/m    Body mass index is 40.08 kg/m .    Exam:  Constitutional:  Appearance: Well nourished, well developed alert, in no acute distress  Neck:  Lymph Nodes:  No lymphadenopathy present; Thyroid:  Gland size normal, nontender, no nodules or masses present on palpation  Chest:  Respiratory Effort:  Breathing unlabored. Clear to auscultation bilaterally.   Cardiovascular: Heart: Auscultation:  Regular rate, normal rhythm, no murmurs present  Gastrointestinal:  Abdominal Examination:  Abdomen nontender to palpation, tone normal without rigidity or guarding, no masses present, umbilicus without lesions; Liver/Spleen:  No hepatomegaly present, liver nontender to palpation; Hernias:  No " hernias present  Lymphatic: Lymph Nodes:  No other lymphadenopathy present  Skin: General Inspection:  No rashes present, no lesions present, no areas of discoloration.  Neurologic:  Mental Status:  Oriented X3.  Normal strength and tone, sensory exam grossly normal, mentation intact and speech normal.    Psychiatric:  Mentation appears normal and affect normal/bright.  No Pelvic Exam performed     In-Clinic Test Results:      ASSESSMENT/PLAN:                                                        ICD-10-CM    1. Pelvic pain in female  R10.2    2. Myoma  D21.9        The patient is cleared for general anesthesia.  She understands the risk and complications of the procedure.  Our goal is to clean up her pelvis as much as possible for any adhesions endometriosis and approach the fibroid if amenable to easy myomectomy.      Boyd Bowles MD  [facility] [place]

## 2021-11-04 ENCOUNTER — OFFICE VISIT (OUTPATIENT)
Dept: OBGYN | Facility: CLINIC | Age: 36
End: 2021-11-04
Payer: COMMERCIAL

## 2021-11-04 VITALS
SYSTOLIC BLOOD PRESSURE: 108 MMHG | WEIGHT: 239 LBS | DIASTOLIC BLOOD PRESSURE: 76 MMHG | HEART RATE: 76 BPM | HEIGHT: 65 IN | BODY MASS INDEX: 39.82 KG/M2

## 2021-11-04 DIAGNOSIS — R10.2 PELVIC PAIN IN FEMALE: Primary | ICD-10-CM

## 2021-11-04 DIAGNOSIS — D21.9 MYOMA: ICD-10-CM

## 2021-11-04 PROCEDURE — 99213 OFFICE O/P EST LOW 20 MIN: CPT | Performed by: OBSTETRICS & GYNECOLOGY

## 2021-11-04 ASSESSMENT — MIFFLIN-ST. JEOR: SCORE: 1776.01

## 2021-11-04 NOTE — H&P
2021  [facility] Boyd Mata MD    OB/Gyn  Pelvic pain in female +1 more    Dx  Pre-Op Exam     Reason for Visit       Progress Notes  Boyd Bowles MD (Physician)     OB/Gyn        SUBJECTIVE:                                                   Paola Couch is a 36 year old female who presents to clinic today for the following health issue(s):  Patient presents with:  Pre-Op Exam: 21: Laparoscopic Myomectomy        HPI: The patient is a 36-year-old with a past history of pelvic pain endometriosis and a known pedunculated uterine fibroid.  She has arrived at a point that is not tenable for pain control and is requesting laparoscopy with clean up and possible myomectomy if the fibroid is easily amenable to approach.  She is healthy at this time.  She understands the risk and complications of the procedure.     No LMP recorded. (Menstrual status: Birth Control)..      Patient is sexually active, .  Using oral contraceptives for contraception.    reports that she has quit smoking. She has never used smokeless tobacco.     STD testing offered?  Declined     Health maintenance updated:  yes     Today's PHQ-2 Score:   PHQ-2 (  [employer]) 2021   Q1: Little interest or pleasure in doing things 0   Q2: Feeling down, depressed or hopeless 0   PHQ-2 Score 0      Today's PHQ-9 Score:   PHQ-9 SCORE 8/15/2019   PHQ-9 Total Score 15      Today's RAUDEL-7 Score:   RAUDEL-7 SCORE 2019   Total Score 9         Problem list and histories reviewed & adjusted, as indicated.  Additional history: as documented.         Patient Active Problem List   Diagnosis     Morbid obesity (H)             Past Surgical History:   Procedure Laterality Date     HEAD & NECK SURGERY   age 15     tooth extraction     LASER CO2 LAPAROSCOPIC VAPORIZATION ENDOMETRIUM N/A 2019     Procedure: LAPAROSCOPY WITH CO2 LASER;  Surgeon: Boyd Bowles MD;  Location:  OR       Social History  "           Tobacco Use     Smoking status: Former Smoker     Smokeless tobacco: Never Used   Substance Use Topics     Alcohol use: Yes       Comment: occasionally       Problem (# of Occurrences) Relation (Name,Age of Onset)     Diabetes (2) Maternal Grandmother, Maternal Grandfather                   Current Outpatient Medications   Medication Sig     Acetaminophen (TYLENOL PO) Take 1,500 mg by mouth daily as needed      amphetamine-dextroamphetamine (ADDERALL XR) 15 MG 24 hr capsule Take 15 mg by mouth every morning      CALCIUM-MAGNESIUM-ZINC PO Take 1 tablet by mouth daily     cholecalciferol (VITAMIN D3) 5000 units TABS tablet Take 5,000 Units by mouth daily     Coenzyme Q10 (COQ10 PO) Take 100 mg by mouth daily      escitalopram (LEXAPRO) 5 MG tablet Take 10 mg by mouth every morning      JUNEL FE 1/20 1-20 MG-MCG tablet TAKE 1 ACTIVE TABLET BY MOUTH DAILY FOR CONTINUOUS SUPPRESSION     magnesium 200 MG TABS Take 200 mg by mouth     metFORMIN (GLUCOPHAGE-XR) 500 MG 24 hr tablet Take 500 mg by mouth     Omega-3 Fatty Acids (FISH OIL PO) Take 900 mg by mouth daily      Prenatal Vit-Fe Fumarate-FA (PRENATAL VITAMIN PO) Take 1 tablet by mouth daily [place] [facility]      No current facility-administered medications for this visit.      No Known Allergies     ROS:  12 point review of systems negative other than symptoms noted below or in the HPI.  No urinary frequency or dysuria, bladder or kidney problems        OBJECTIVE:      /76   Pulse 76   Ht 1.645 m (5' 4.75\")   Wt 108.4 kg (239 lb)   BMI 40.08 kg/m    Body mass index is 40.08 kg/m .     Exam:  Constitutional:  Appearance: Well nourished, well developed alert, in no acute distress  Neck:  Lymph Nodes:  No lymphadenopathy present; Thyroid:  Gland size normal, nontender, no nodules or masses present on palpation  Chest:  Respiratory Effort:  Breathing unlabored. Clear to auscultation bilaterally.   Cardiovascular: Heart: Auscultation:  Regular rate, " "normal rhythm, no murmurs present  Gastrointestinal:  Abdominal Examination:  Abdomen nontender to palpation, tone normal without rigidity or guarding, no masses present, umbilicus without lesions; Liver/Spleen:  No hepatomegaly present, liver nontender to palpation; Hernias:  No hernias present  Lymphatic: Lymph Nodes:  No other lymphadenopathy present  Skin: General Inspection:  No rashes present, no lesions present, no areas of discoloration.  Neurologic:  Mental Status:  Oriented X3.  Normal strength and tone, sensory exam grossly normal, mentation intact and speech normal.    Psychiatric:  Mentation appears normal and affect normal/bright.  No Pelvic Exam performed      In-Clinic Test Results:        ASSESSMENT/PLAN:                                                           ICD-10-CM     1. Pelvic pain in female  R10.2     2. Myoma  D21.9           The patient is cleared for general anesthesia.  She understands the risk and complications of the procedure.  Our goal is to clean up her pelvis as much as possible for any adhesions endometriosis and approach the fibroid if amenable to easy myomectomy.        Boyd Bowles MD  [facility] [place]        Instructions     After Visit Summary (Automatic SnapShot taken 11/4/2021)        Additional Documentation    Vitals:  /76     Pulse 76     Ht 1.645 m (5' 4.75\")     Wt 108.4 kg (239 lb)     BMI 40.08 kg/m      BSA 2.23 m      Flowsheets:  Ambulatory Assessments,     Vitals Reassessment,     NICU VS,     Anthropometrics        Encounter Info:  Billing Info,     History,     Allergies,     Detailed Report          AVS Reports    Date/Time Report Action User   11/4/2021  1:15 PM After Visit Summary Automatically Generated Boyd Bowles MD     Encounter Information     Provider Department Encounter # Center   11/4/2021 12:45 PM Boyd Bowles MD [facility] [id number] [place]     Reviewed this Encounter     Medications Problems " Allergies History   Kelsea Mendez MA   Reviewed ADL, Alcohol, Custom, Drug Use, Family, Medical, Sexual Activity, Surgical, Tobacco     Communicable/Travel screen    Communicable/Travel Screening 6/9/2021 6/23/2021 8/31/2021 10/5/2021 11/4/2021   In the last month, have you been in contact with someone who was confirmed or suspected to have Coronavirus / COVID-19? No / Unsure No / Unsure No / Unsure No / Unsure No / Unsure   Do you have any of the following symptoms? None of these None of these None of these None of these None of these   View Complete Flowsheet ...More Data Exists         Orders Placed     None      Medication Changes         None       Medication List     Visit Diagnoses         Pelvic pain in female         Myoma       Problem List

## 2021-11-04 NOTE — TELEPHONE ENCOUNTER
Per Dr Bowles need to have the C02 Laser for this case  Spoke w/Jacqui at [facility] for laser add on.  She confirmed with tech that it is available    Jacqui Nieto  Surgery Scheduler

## 2021-11-06 ENCOUNTER — LAB (OUTPATIENT)
Dept: URGENT CARE | Facility: URGENT CARE | Age: 36
End: 2021-11-06
Payer: COMMERCIAL

## 2021-11-06 DIAGNOSIS — Z11.59 ENCOUNTER FOR SCREENING FOR OTHER VIRAL DISEASES: ICD-10-CM

## 2021-11-06 PROCEDURE — U0005 INFEC AGEN DETEC AMPLI PROBE: HCPCS

## 2021-11-06 PROCEDURE — U0003 INFECTIOUS AGENT DETECTION BY NUCLEIC ACID (DNA OR RNA); SEVERE ACUTE RESPIRATORY SYNDROME CORONAVIRUS 2 (SARS-COV-2) (CORONAVIRUS DISEASE [COVID-19]), AMPLIFIED PROBE TECHNIQUE, MAKING USE OF HIGH THROUGHPUT TECHNOLOGIES AS DESCRIBED BY CMS-2020-01-R: HCPCS

## 2021-11-07 LAB — SARS-COV-2 RNA RESP QL NAA+PROBE: NEGATIVE

## 2021-11-08 ENCOUNTER — ANESTHESIA EVENT (OUTPATIENT)
Dept: SURGERY | Facility: CLINIC | Age: 36
End: 2021-11-08
Payer: COMMERCIAL

## 2021-11-08 NOTE — RESULT ENCOUNTER NOTE
Paola      Your covid test results are negative  If further questions please give me a call.    Lore Bryan RNC

## 2021-11-09 ENCOUNTER — ANESTHESIA (OUTPATIENT)
Dept: SURGERY | Facility: CLINIC | Age: 36
End: 2021-11-09
Payer: COMMERCIAL

## 2021-11-09 ENCOUNTER — HOSPITAL ENCOUNTER (OUTPATIENT)
Facility: CLINIC | Age: 36
Discharge: HOME | End: 2021-11-09
Attending: OBSTETRICS & GYNECOLOGY | Admitting: OBSTETRICS & GYNECOLOGY
Payer: COMMERCIAL

## 2021-11-09 VITALS
HEIGHT: 64 IN | SYSTOLIC BLOOD PRESSURE: 114 MMHG | DIASTOLIC BLOOD PRESSURE: 83 MMHG | WEIGHT: 241.8 LBS | OXYGEN SATURATION: 93 % | BODY MASS INDEX: 41.28 KG/M2 | TEMPERATURE: 98.6 F | RESPIRATION RATE: 16 BRPM | HEART RATE: 83 BPM

## 2021-11-09 DIAGNOSIS — D21.9 MYOMA: ICD-10-CM

## 2021-11-09 LAB
B-HCG SERPL-ACNC: <1 IU/L (ref 0–5)
GLUCOSE BLDC GLUCOMTR-MCNC: 145 MG/DL (ref 70–99)
GLUCOSE BLDC GLUCOMTR-MCNC: 200 MG/DL (ref 70–99)

## 2021-11-09 PROCEDURE — 84702 CHORIONIC GONADOTROPIN TEST: CPT | Performed by: OBSTETRICS & GYNECOLOGY

## 2021-11-09 PROCEDURE — 272N000001 HC OR GENERAL SUPPLY STERILE: Performed by: OBSTETRICS & GYNECOLOGY

## 2021-11-09 PROCEDURE — 710N000012 HC RECOVERY PHASE 2, PER MINUTE: Performed by: OBSTETRICS & GYNECOLOGY

## 2021-11-09 PROCEDURE — 82962 GLUCOSE BLOOD TEST: CPT

## 2021-11-09 PROCEDURE — 258N000003 HC RX IP 258 OP 636: Performed by: ANESTHESIOLOGY

## 2021-11-09 PROCEDURE — 360N000077 HC SURGERY LEVEL 4, PER MIN: Performed by: OBSTETRICS & GYNECOLOGY

## 2021-11-09 PROCEDURE — 258N000003 HC RX IP 258 OP 636: Performed by: REGISTERED NURSE

## 2021-11-09 PROCEDURE — 999N000141 HC STATISTIC PRE-PROCEDURE NURSING ASSESSMENT: Performed by: OBSTETRICS & GYNECOLOGY

## 2021-11-09 PROCEDURE — 250N000011 HC RX IP 250 OP 636: Performed by: REGISTERED NURSE

## 2021-11-09 PROCEDURE — 250N000011 HC RX IP 250 OP 636: Performed by: ANESTHESIOLOGY

## 2021-11-09 PROCEDURE — 250N000009 HC RX 250: Performed by: REGISTERED NURSE

## 2021-11-09 PROCEDURE — 250N000013 HC RX MED GY IP 250 OP 250 PS 637: Performed by: ANESTHESIOLOGY

## 2021-11-09 PROCEDURE — 250N000011 HC RX IP 250 OP 636: Performed by: OBSTETRICS & GYNECOLOGY

## 2021-11-09 PROCEDURE — 58662 LAPAROSCOPY EXCISE LESIONS: CPT | Performed by: OBSTETRICS & GYNECOLOGY

## 2021-11-09 PROCEDURE — 370N000017 HC ANESTHESIA TECHNICAL FEE, PER MIN: Performed by: OBSTETRICS & GYNECOLOGY

## 2021-11-09 PROCEDURE — 250N000013 HC RX MED GY IP 250 OP 250 PS 637: Performed by: OBSTETRICS & GYNECOLOGY

## 2021-11-09 PROCEDURE — 36415 COLL VENOUS BLD VENIPUNCTURE: CPT | Performed by: OBSTETRICS & GYNECOLOGY

## 2021-11-09 PROCEDURE — 710N000009 HC RECOVERY PHASE 1, LEVEL 1, PER MIN: Performed by: OBSTETRICS & GYNECOLOGY

## 2021-11-09 RX ORDER — PROPOFOL 10 MG/ML
INJECTION, EMULSION INTRAVENOUS PRN
Status: DISCONTINUED | OUTPATIENT
Start: 2021-11-09 | End: 2021-11-09

## 2021-11-09 RX ORDER — PROPOFOL 10 MG/ML
INJECTION, EMULSION INTRAVENOUS CONTINUOUS PRN
Status: DISCONTINUED | OUTPATIENT
Start: 2021-11-09 | End: 2021-11-09

## 2021-11-09 RX ORDER — DEXAMETHASONE SODIUM PHOSPHATE 4 MG/ML
INJECTION, SOLUTION INTRA-ARTICULAR; INTRALESIONAL; INTRAMUSCULAR; INTRAVENOUS; SOFT TISSUE PRN
Status: DISCONTINUED | OUTPATIENT
Start: 2021-11-09 | End: 2021-11-09

## 2021-11-09 RX ORDER — FENTANYL CITRATE 0.05 MG/ML
25 INJECTION, SOLUTION INTRAMUSCULAR; INTRAVENOUS
Status: CANCELLED | OUTPATIENT
Start: 2021-11-09

## 2021-11-09 RX ORDER — HYDROXYZINE HYDROCHLORIDE 50 MG/ML
25 INJECTION, SOLUTION INTRAMUSCULAR ONCE
Status: COMPLETED | OUTPATIENT
Start: 2021-11-09 | End: 2021-11-09

## 2021-11-09 RX ORDER — LIDOCAINE HYDROCHLORIDE 20 MG/ML
INJECTION, SOLUTION INFILTRATION; PERINEURAL PRN
Status: DISCONTINUED | OUTPATIENT
Start: 2021-11-09 | End: 2021-11-09

## 2021-11-09 RX ORDER — ONDANSETRON 4 MG/1
4 TABLET, ORALLY DISINTEGRATING ORAL EVERY 30 MIN PRN
Status: DISCONTINUED | OUTPATIENT
Start: 2021-11-09 | End: 2021-11-09 | Stop reason: HOSPADM

## 2021-11-09 RX ORDER — GLYCOPYRROLATE 0.2 MG/ML
INJECTION, SOLUTION INTRAMUSCULAR; INTRAVENOUS PRN
Status: DISCONTINUED | OUTPATIENT
Start: 2021-11-09 | End: 2021-11-09

## 2021-11-09 RX ORDER — OXYCODONE HYDROCHLORIDE 5 MG/1
5 TABLET ORAL EVERY 4 HOURS PRN
Status: DISCONTINUED | OUTPATIENT
Start: 2021-11-09 | End: 2021-11-09 | Stop reason: HOSPADM

## 2021-11-09 RX ORDER — ONDANSETRON 2 MG/ML
4 INJECTION INTRAMUSCULAR; INTRAVENOUS EVERY 30 MIN PRN
Status: DISCONTINUED | OUTPATIENT
Start: 2021-11-09 | End: 2021-11-09 | Stop reason: HOSPADM

## 2021-11-09 RX ORDER — FENTANYL CITRATE 0.05 MG/ML
25 INJECTION, SOLUTION INTRAMUSCULAR; INTRAVENOUS EVERY 5 MIN PRN
Status: DISCONTINUED | OUTPATIENT
Start: 2021-11-09 | End: 2021-11-09 | Stop reason: HOSPADM

## 2021-11-09 RX ORDER — ACETAMINOPHEN 325 MG/1
975 TABLET ORAL ONCE
Status: DISCONTINUED | OUTPATIENT
Start: 2021-11-09 | End: 2021-11-09 | Stop reason: HOSPADM

## 2021-11-09 RX ORDER — ACETAMINOPHEN 325 MG/1
975 TABLET ORAL ONCE
Status: COMPLETED | OUTPATIENT
Start: 2021-11-09 | End: 2021-11-09

## 2021-11-09 RX ORDER — FENTANYL CITRATE 50 UG/ML
INJECTION, SOLUTION INTRAMUSCULAR; INTRAVENOUS PRN
Status: DISCONTINUED | OUTPATIENT
Start: 2021-11-09 | End: 2021-11-09

## 2021-11-09 RX ORDER — HYDROMORPHONE HCL IN WATER/PF 6 MG/30 ML
0.2 PATIENT CONTROLLED ANALGESIA SYRINGE INTRAVENOUS EVERY 5 MIN PRN
Status: DISCONTINUED | OUTPATIENT
Start: 2021-11-09 | End: 2021-11-09

## 2021-11-09 RX ORDER — IBUPROFEN 400 MG/1
800 TABLET, FILM COATED ORAL ONCE
Status: DISCONTINUED | OUTPATIENT
Start: 2021-11-09 | End: 2021-11-09 | Stop reason: HOSPADM

## 2021-11-09 RX ORDER — SODIUM CHLORIDE, SODIUM LACTATE, POTASSIUM CHLORIDE, CALCIUM CHLORIDE 600; 310; 30; 20 MG/100ML; MG/100ML; MG/100ML; MG/100ML
INJECTION, SOLUTION INTRAVENOUS CONTINUOUS PRN
Status: DISCONTINUED | OUTPATIENT
Start: 2021-11-09 | End: 2021-11-09

## 2021-11-09 RX ORDER — KETOROLAC TROMETHAMINE 30 MG/ML
INJECTION, SOLUTION INTRAMUSCULAR; INTRAVENOUS PRN
Status: DISCONTINUED | OUTPATIENT
Start: 2021-11-09 | End: 2021-11-09

## 2021-11-09 RX ORDER — MEPERIDINE HYDROCHLORIDE 25 MG/ML
12.5 INJECTION INTRAMUSCULAR; INTRAVENOUS; SUBCUTANEOUS
Status: DISCONTINUED | OUTPATIENT
Start: 2021-11-09 | End: 2021-11-09 | Stop reason: HOSPADM

## 2021-11-09 RX ORDER — HYDROMORPHONE HCL IN WATER/PF 6 MG/30 ML
0.4 PATIENT CONTROLLED ANALGESIA SYRINGE INTRAVENOUS EVERY 5 MIN PRN
Status: DISCONTINUED | OUTPATIENT
Start: 2021-11-09 | End: 2021-11-09 | Stop reason: HOSPADM

## 2021-11-09 RX ORDER — BUPIVACAINE HYDROCHLORIDE 2.5 MG/ML
INJECTION, SOLUTION EPIDURAL; INFILTRATION; INTRACAUDAL
Status: DISCONTINUED
Start: 2021-11-09 | End: 2021-11-09 | Stop reason: HOSPADM

## 2021-11-09 RX ORDER — SODIUM CHLORIDE, SODIUM LACTATE, POTASSIUM CHLORIDE, CALCIUM CHLORIDE 600; 310; 30; 20 MG/100ML; MG/100ML; MG/100ML; MG/100ML
INJECTION, SOLUTION INTRAVENOUS CONTINUOUS
Status: DISCONTINUED | OUTPATIENT
Start: 2021-11-09 | End: 2021-11-09 | Stop reason: HOSPADM

## 2021-11-09 RX ORDER — NEOSTIGMINE METHYLSULFATE 1 MG/ML
VIAL (ML) INJECTION PRN
Status: DISCONTINUED | OUTPATIENT
Start: 2021-11-09 | End: 2021-11-09

## 2021-11-09 RX ORDER — BUPIVACAINE HYDROCHLORIDE 2.5 MG/ML
INJECTION, SOLUTION INFILTRATION; PERINEURAL PRN
Status: DISCONTINUED | OUTPATIENT
Start: 2021-11-09 | End: 2021-11-09 | Stop reason: HOSPADM

## 2021-11-09 RX ORDER — OXYCODONE HYDROCHLORIDE 5 MG/1
5-10 TABLET ORAL EVERY 4 HOURS PRN
Qty: 15 TABLET | Refills: 0 | Status: SHIPPED | OUTPATIENT
Start: 2021-11-09 | End: 2021-12-08

## 2021-11-09 RX ORDER — HEPARIN SODIUM 1000 [USP'U]/ML
INJECTION, SOLUTION INTRAVENOUS; SUBCUTANEOUS
Status: DISCONTINUED
Start: 2021-11-09 | End: 2021-11-09 | Stop reason: HOSPADM

## 2021-11-09 RX ADMIN — FENTANYL CITRATE 25 MCG: 50 INJECTION, SOLUTION INTRAMUSCULAR; INTRAVENOUS at 11:11

## 2021-11-09 RX ADMIN — FENTANYL CITRATE 100 MCG: 50 INJECTION, SOLUTION INTRAMUSCULAR; INTRAVENOUS at 10:11

## 2021-11-09 RX ADMIN — DEXMEDETOMIDINE HYDROCHLORIDE 0.5 MCG/KG/HR: 100 INJECTION, SOLUTION INTRAVENOUS at 10:11

## 2021-11-09 RX ADMIN — SODIUM CHLORIDE, POTASSIUM CHLORIDE, SODIUM LACTATE AND CALCIUM CHLORIDE: 600; 310; 30; 20 INJECTION, SOLUTION INTRAVENOUS at 11:57

## 2021-11-09 RX ADMIN — FENTANYL CITRATE 25 MCG: 50 INJECTION, SOLUTION INTRAMUSCULAR; INTRAVENOUS at 11:06

## 2021-11-09 RX ADMIN — ROCURONIUM BROMIDE 30 MG: 50 INJECTION, SOLUTION INTRAVENOUS at 10:11

## 2021-11-09 RX ADMIN — KETOROLAC TROMETHAMINE 30 MG: 30 INJECTION, SOLUTION INTRAMUSCULAR at 10:44

## 2021-11-09 RX ADMIN — OXYCODONE HYDROCHLORIDE 5 MG: 5 TABLET ORAL at 12:16

## 2021-11-09 RX ADMIN — GLYCOPYRROLATE 0.8 MG: 0.2 INJECTION, SOLUTION INTRAMUSCULAR; INTRAVENOUS at 10:45

## 2021-11-09 RX ADMIN — PROPOFOL 200 MG: 10 INJECTION, EMULSION INTRAVENOUS at 10:11

## 2021-11-09 RX ADMIN — ONDANSETRON 4 MG: 2 INJECTION INTRAMUSCULAR; INTRAVENOUS at 11:36

## 2021-11-09 RX ADMIN — MIDAZOLAM 2 MG: 1 INJECTION INTRAMUSCULAR; INTRAVENOUS at 10:05

## 2021-11-09 RX ADMIN — NEOSTIGMINE METHYLSULFATE 5 MG: 1 INJECTION, SOLUTION INTRAVENOUS at 10:45

## 2021-11-09 RX ADMIN — ACETAMINOPHEN 975 MG: 325 TABLET, FILM COATED ORAL at 09:43

## 2021-11-09 RX ADMIN — DEXAMETHASONE SODIUM PHOSPHATE 4 MG: 4 INJECTION, SOLUTION INTRA-ARTICULAR; INTRALESIONAL; INTRAMUSCULAR; INTRAVENOUS; SOFT TISSUE at 10:16

## 2021-11-09 RX ADMIN — HYDROMORPHONE HYDROCHLORIDE 0.4 MG: 0.2 INJECTION, SOLUTION INTRAMUSCULAR; INTRAVENOUS; SUBCUTANEOUS at 11:24

## 2021-11-09 RX ADMIN — PROPOFOL 150 MCG/KG/MIN: 10 INJECTION, EMULSION INTRAVENOUS at 10:11

## 2021-11-09 RX ADMIN — ROCURONIUM BROMIDE 10 MG: 50 INJECTION, SOLUTION INTRAVENOUS at 10:25

## 2021-11-09 RX ADMIN — LIDOCAINE HYDROCHLORIDE 100 MG: 20 INJECTION, SOLUTION INFILTRATION; PERINEURAL at 10:11

## 2021-11-09 RX ADMIN — SODIUM CHLORIDE, POTASSIUM CHLORIDE, SODIUM LACTATE AND CALCIUM CHLORIDE: 600; 310; 30; 20 INJECTION, SOLUTION INTRAVENOUS at 10:05

## 2021-11-09 RX ADMIN — HYDROMORPHONE HYDROCHLORIDE 0.5 MG: 1 INJECTION, SOLUTION INTRAMUSCULAR; INTRAVENOUS; SUBCUTANEOUS at 10:31

## 2021-11-09 RX ADMIN — HYDROXYZINE HYDROCHLORIDE 25 MG: 50 INJECTION, SOLUTION INTRAMUSCULAR at 12:04

## 2021-11-09 ASSESSMENT — MIFFLIN-ST. JEOR: SCORE: 1776.8

## 2021-11-09 NOTE — ANESTHESIA PREPROCEDURE EVALUATION
Anesthesia Pre-Procedure Evaluation    Patient: Paola Couch   MRN: [id number] : 1985        Preoperative Diagnosis: Myoma [D21.9]    Procedure : Procedure(s):  Laparoscopy for removal of fibroid  with Co2 Laser          Past Medical History:   Diagnosis Date     Anxiety      Complication of anesthesia     panic attack     Diabetes (H)      Endometriosis      HPV in female      IBS (irritable bowel syndrome)       Past Surgical History:   Procedure Laterality Date     GYN SURGERY  2018    laparoscopy for endometriosis     HEAD & NECK SURGERY  age 15    tooth extraction     LASER CO2 LAPAROSCOPIC VAPORIZATION ENDOMETRIUM N/A 2019    Procedure: LAPAROSCOPY WITH CO2 LASER;  Surgeon: Boyd Bowles MD;  Location: [facility] OR      No Known Allergies   Social History     Tobacco Use     Smoking status: Former Smoker     Smokeless tobacco: Never Used   Substance Use Topics     Alcohol use: Yes     Comment: occasionally      Wt Readings from Last 1 Encounters:   21 109.7 kg (241 lb 12.8 oz)        Anesthesia Evaluation   Pt has had prior anesthetic. Type of anesthetic: Panic attacks with anesthesia.    History of anesthetic complications       ROS/MED HX  ENT/Pulmonary:  - neg pulmonary ROS     Neurologic:  - neg neurologic ROS     Cardiovascular:  - neg cardiovascular ROS     METS/Exercise Tolerance:     Hematologic:  - neg hematologic  ROS     Musculoskeletal:  - neg musculoskeletal ROS     GI/Hepatic: Comment: Endometriosis.      Renal/Genitourinary:  - neg Renal ROS     Endo:     (+) type II DM, Obesity,     Psychiatric/Substance Use:  - neg psychiatric ROS     Infectious Disease:       Malignancy:       Other:            Physical Exam    Airway  airway exam normal      Mallampati: II       Respiratory Devices and Support         Dental  no notable dental history         Cardiovascular   cardiovascular exam normal          Pulmonary   pulmonary exam normal                OUTSIDE LABS:  CBC: No  results found for: WBC, HGB, HCT, PLT  BMP:   Lab Results   Component Value Date     (H) 11/09/2021     COAGS: No results found for: PTT, INR, FIBR  POC:   Lab Results   Component Value Date    HCG Negative 08/31/2021     HEPATIC: No results found for: ALBUMIN, PROTTOTAL, ALT, AST, GGT, ALKPHOS, BILITOTAL, BILIDIRECT, ANN  OTHER: No results found for: PH, LACT, A1C, INTESH, PHOS, MAG, LIPASE, AMYLASE, TSH, T4, T3, CRP, SED    Anesthesia Plan    ASA Status:  2      Anesthesia Type: General.     - Airway: ETT   Induction: Intravenous.   Maintenance: Balanced.        Consents    Anesthesia Plan(s) and associated risks, benefits, and realistic alternatives discussed. Questions answered and patient/representative(s) expressed understanding.     - Discussed with:  Patient         Postoperative Care    Pain management: IV analgesics, Multi-modal analgesia.   PONV prophylaxis: Ondansetron (or other 5HT-3)     Comments:    Precedex during anesthetic. Versed for pre-medication. Toradol if allowed by surgeon.            Ermias Jarvis MD

## 2021-11-09 NOTE — DISCHARGE INSTRUCTIONS
Today you were given 975 mg of Tylenol at 9:45am. The recommended daily maximum dose is 4000 mg.     Today you received Toradol, an antiinflammatory medication similar to Ibuprofen.  You should not take other antiinflammatory medication, such as Ibuprofen, Motrin, Advil, Aleve, Naprosyn, etc until 1700.       Same Day Surgery Discharge Instructions for  Sedation and General Anesthesia       It's not unusual to feel dizzy, light-headed or faint for up to 24 hours after surgery or while taking pain medication.  If you have these symptoms: sit for a few minutes before standing and have someone assist you when you get up to walk or use the bathroom.      You should rest and relax for the next 24 hours. We recommend you make arrangements to have an adult stay with you for at least 24 hours after your discharge.  Avoid hazardous and strenuous activity.      DO NOT DRIVE any vehicle or operate mechanical equipment for 24 hours following the end of your surgery.  Even though you may feel normal, your reactions may be affected by the medication you have received.      Do not drink alcoholic beverages for 24 hours following surgery.       Slowly progress to your regular diet as you feel able. It's not unusual to feel nauseated and/or vomit after receiving anesthesia.  If you develop these symptoms, drink clear liquids (apple juice, ginger ale, broth, 7-up, etc. ) until you feel better.  If your nausea and vomiting persists for 24 hours, please notify your surgeon.        All narcotic pain medications, along with inactivity and anesthesia, can cause constipation. Drinking plenty of liquids and increasing fiber intake will help.      For any questions of a medical nature, call your surgeon.      Do not make important decisions for 24 hours.      If you had general anesthesia, you may have a sore throat for a couple of days related to the breathing tube used during surgery.  You may use Cepacol lozenges to help with this  discomfort.  If it worsens or if you develop a fever, contact your surgeon.       If you feel your pain is not well managed with the pain medications prescribed by your surgeon, please contact your surgeon's office to let them know so they can address your concerns.       CoVid 19 Information    We want to give you information regarding Covid. Please consult your primary care provider with any questions you might have.     Patient who have symptoms (cough, fever, or shortness of breath), need to isolate for 7 days from when symptoms started OR 72 hours after fever resolves (without fever reducing medications) AND improvement of respiratory symptoms (whichever is longer).      Isolate yourself at home (in own room/own bathroom if possible)    Do Not allow any visitors    Do Not go to work or school    Do Not go to Christianity,  centers, shopping, or other public places.    Do Not shake hands.    Avoid close and intimate contact with others (hugging, kissing).    Follow CDC recommendations for household cleaning of frequently touched services.     After the initial 7 days, continue to isolate yourself from household members as much as possible. To continue decrease the risk of community spread and exposure, you and any members of your household should limit activities in public for 14 days after starting home isolation.     You can reference the following CDC link for helpful home isolation/care tips:  https://www.cdc.gov/coronavirus/2019-ncov/downloads/10Things.pdf    Protect Others:    Cover Your Mouth and Nose with a mask, disposable tissue or wash cloth to avoid spreading germs to others.    Wash your hands and face frequently with soap and water    Call Your Primary Doctor If: Breathing difficulty develops or you become worse.    For more information about COVID19 and options for caring for yourself at home, please visit the CDC website at  https://www.cdc.gov/coronavirus/2019-ncov/about/steps-when-sick.html  For more options for care at [facility], please visit our website at [email or web address]

## 2021-11-09 NOTE — ANESTHESIA CARE TRANSFER NOTE
Patient: Paola Couch    Procedure: Procedure(s):  Laparoscopy lysis of adhesions  with Co2 Laser       Diagnosis: Myoma [D21.9]  Diagnosis Additional Information: No value filed.    Anesthesia Type:   General     Note:    Oropharynx: oropharynx clear of all foreign objects  Level of Consciousness: drowsy  Oxygen Supplementation: face mask  Level of Supplemental Oxygen (L/min / FiO2): 6  Independent Airway: airway patency satisfactory and stable  Dentition: dentition unchanged  Vital Signs Stable: post-procedure vital signs reviewed and stable  Report to RN Given: handoff report given  Patient transferred to: PACU  Comments: Patient comfortable  Handoff Report: Identifed the Patient, Identified the Reponsible Provider, Reviewed the pertinent medical history, Discussed the surgical course, Reviewed Intra-OP anesthesia mangement and issues during anesthesia, Set expectations for post-procedure period and Allowed opportunity for questions and acknowledgement of understanding      Vitals:  Vitals Value Taken Time   BP     Temp     Pulse 86 11/09/21 1058   Resp 13 11/09/21 1058   SpO2 98 % 11/09/21 1058   Vitals shown include unvalidated device data.    Electronically Signed By: DANIE Kuhn CRNA  November 9, 2021  10:59 AM

## 2021-11-09 NOTE — INTERVAL H&P NOTE
I have reviewed the surgical (or preoperative) H&P that is linked to this encounter, and examined the patient. Noted changes include: see my pre-op note.

## 2021-11-09 NOTE — ANESTHESIA POSTPROCEDURE EVALUATION
Patient: Paola Couch    Procedure: Procedure(s):  Laparoscopy lysis of adhesions  with Co2 Laser       Diagnosis:Myoma [D21.9]  Diagnosis Additional Information: No value filed.    Anesthesia Type:  General    Note:     Postop Pain Control: Uneventful            Sign Out: Well controlled pain   PONV: No   Neuro/Psych: Uneventful            Sign Out: Acceptable/Baseline neuro status   Airway/Respiratory: Uneventful            Sign Out: Acceptable/Baseline resp. status   CV/Hemodynamics: Uneventful            Sign Out: Acceptable CV status; No obvious hypovolemia; No obvious fluid overload   Other NRE: NONE   DID A NON-ROUTINE EVENT OCCUR? No    Event details/Postop Comments:  Had some post-procedure pain as expected. Improved during the course of recovery. Doing well now. Please call with questions.           Last vitals:  Vitals Value Taken Time   BP 36/24 11/09/21 1245   Temp 37  C (98.6  F) 11/09/21 1230   Pulse 86 11/09/21 1249   Resp 60 11/09/21 1249   SpO2 94 % 11/09/21 1244   Vitals shown include unvalidated device data.    Electronically Signed By: Ermias Jarvis MD  November 9, 2021  3:07 PM

## 2021-11-09 NOTE — OP NOTE
Procedure Date: 11/09/2021    REASON FOR ADMISSION:  Pelvic pain, known uterine fibroid.    OPERATIVE PROCEDURES:  Diagnostic laparoscopy, laser lysis of adhesion, laser vaporization of endometriosis.    OPERATIVE FINDINGS:  The patient has an anterior subserosal 2.5 cm fibroid.  There was some endometriosis in the anterior cul-de-sac, but multiple implants in the posterior cul-de-sac and under the uterosacral ligaments.  There was surface disease on both ovaries.  The tubes looked normal.  We had discussed the possibility of myomectomy, but the patient did not want a more complicated procedure at this time.  It was not pedunculated and was certainly small enough that it could be observed.    DESCRIPTION OF PROCEDURE:  After general anesthesia was induced, the patient was placed in the dorsal lithotomy position and prepped and draped in the usual fashion.  A Hua catheter was placed.  A uterine manipulator was placed.    Through a subumbilical incision, the Veress needle was placed and 3 liters of CO2 insufflated.  The laparoscope, trocar, and sheath were placed without incident.  A 5 mm left lower quadrant trocar was placed under direct vision.  The above findings were noted.  The laser scope was brought in and all adhesions on the uterosacral ligaments and under the fallopian tubes bilaterally were taken down.  All areas of endometriosis were vaporized away.  The surface disease on both ovaries was vaporized.  There was no deep endometriosis.  Upper abdomen exploration showed no sign of any adhesive disease.  There was good mobility of the uterus.  At the conclusion of the procedure, all gas was exhausted and instruments were removed.  All sites were hemostatic, and both ureters were found to be peristalsing normally.  Urine output for the case was normal and clear.    The incisions were closed with 4-0 Vicryl and Steri-Strips.  The estimated blood loss was minimal.  The patient went to the recovery room and was  given Toradol.     She will return to the recovery room for observation and discharge to home.  We will see her in the office in 2 weeks for a postoperative check.  She was given oxycodone as needed for pain.  She is asked to call for any fever, chills, change in bowel or bladder function.    Boyd Bowles Jr, MD        D: 2021   T: 2021   MT: NORA    Name:     ANDREW SHAHHaroldo  MRN:      [id number]        Account:        [id number]   :      1985           Procedure Date: 2021     Document: [id number]

## 2021-11-09 NOTE — ANESTHESIA PROCEDURE NOTES
Airway       Patient location during procedure: OR       Procedure Start/Stop Times: 11/9/2021 10:15 AM  Staff -        CRNA: Courtney Maradiaga APRN CRNA       Performed By: CRNA  Consent for Airway        Urgency: elective  Indications and Patient Condition       Indications for airway management: trent-procedural       Induction type:intravenous       Mask difficulty assessment: 1 - vent by mask    Final Airway Details       Final airway type: endotracheal airway       Successful airway: ETT - single  Endotracheal Airway Details        ETT size (mm): 7.0       Cuffed: yes       Successful intubation technique: direct laryngoscopy       DL Blade Type: Erickson 2       Grade View of Cords: 1       Adjucts: stylet       Position: Right       Measured from: gums/teeth       Secured at (cm): 21       Bite block used: None    Post intubation assessment        Placement verified by: capnometry, equal breath sounds and chest rise        Number of attempts at approach: 1       Number of other approaches attempted: 0       Secured with: pink tape       Ease of procedure: easy       Dentition: Intact and Unchanged

## 2021-11-09 NOTE — BRIEF OP NOTE
[facility]    Brief Operative Note    Pre-operative diagnosis: pelvic pain  Post-operative diagnosis endometriosis, adhesions, myoma    Procedure: Procedure(s):  Laparoscopy lysis of adhesions  with Co2 Laser,laser vaporization of endometriosis  Surgeon: Surgeon(s) and Role:  Panel 1:     * Boyd Bowles MD - Primary  Panel 2:     * Boyd Bowles MD - Primary  Anesthesia: General   Estimated Blood Loss: Minimal    Drains: None  Specimens  none  Findings: stage 2 endometriosis  Complications: none

## 2021-12-08 ENCOUNTER — OFFICE VISIT (OUTPATIENT)
Dept: OBGYN | Facility: CLINIC | Age: 36
End: 2021-12-08
Payer: COMMERCIAL

## 2021-12-08 VITALS
WEIGHT: 240.4 LBS | HEIGHT: 64 IN | SYSTOLIC BLOOD PRESSURE: 132 MMHG | DIASTOLIC BLOOD PRESSURE: 82 MMHG | BODY MASS INDEX: 41.04 KG/M2

## 2021-12-08 DIAGNOSIS — N80.9 ENDOMETRIOSIS: ICD-10-CM

## 2021-12-08 PROCEDURE — 99024 POSTOP FOLLOW-UP VISIT: CPT | Performed by: OBSTETRICS & GYNECOLOGY

## 2021-12-08 RX ORDER — NORETHINDRONE ACETATE AND ETHINYL ESTRADIOL 1MG-20(21)
KIT ORAL
Qty: 112 TABLET | Refills: 3 | Status: SHIPPED | OUTPATIENT
Start: 2021-12-08 | End: 2023-01-10

## 2021-12-08 ASSESSMENT — MIFFLIN-ST. JEOR: SCORE: 1770.45

## 2021-12-08 NOTE — PROGRESS NOTES
The patient is seen at this time for postoperative check after a laparoscopy in early November.  Almost immediately after surgery the patient contracted Covid and was very sick for 2 weeks.  She has no residual at this time.  Her incisions of healed well.  We discussed her findings of stage II Endo in the areas that we have treated.  She will see the reproductive people about possible retrieval in January.

## 2022-01-05 NOTE — TELEPHONE ENCOUNTER
FUTURE VISIT INFORMATION      FUTURE VISIT INFORMATION:    Date: 2/4/2022    Time: 115pm    [place] [facility] [place]:    Referring provider:  Self     Referring providers clinic:      Reason for visit/diagnosis  Numbness     RECORDS REQUESTED FROM:       Clinic name Comments Records Status Imaging Status   Internal Dr. Bowles-6/9/2021, 11/4/2021 Epic N/A

## 2022-02-04 ENCOUNTER — PRE VISIT (OUTPATIENT)
Dept: NEUROLOGY | Facility: CLINIC | Age: 37
End: 2022-02-04

## 2022-02-04 ENCOUNTER — LAB (OUTPATIENT)
Dept: LAB | Facility: CLINIC | Age: 37
End: 2022-02-04
Payer: COMMERCIAL

## 2022-02-04 ENCOUNTER — OFFICE VISIT (OUTPATIENT)
Dept: NEUROLOGY | Facility: CLINIC | Age: 37
End: 2022-02-04
Payer: COMMERCIAL

## 2022-02-04 VITALS — HEART RATE: 112 BPM | SYSTOLIC BLOOD PRESSURE: 128 MMHG | OXYGEN SATURATION: 95 % | DIASTOLIC BLOOD PRESSURE: 87 MMHG

## 2022-02-04 DIAGNOSIS — E53.8 VITAMIN B12 DEFICIENCY (NON ANEMIC): ICD-10-CM

## 2022-02-04 DIAGNOSIS — G62.9 PERIPHERAL POLYNEUROPATHY: Primary | ICD-10-CM

## 2022-02-04 DIAGNOSIS — G62.9 PERIPHERAL POLYNEUROPATHY: ICD-10-CM

## 2022-02-04 DIAGNOSIS — N80.9 ENDOMETRIOSIS: ICD-10-CM

## 2022-02-04 PROBLEM — F90.9 ATTENTION DEFICIT HYPERACTIVITY DISORDER (ADHD): Status: ACTIVE | Noted: 2017-08-21

## 2022-02-04 PROBLEM — K58.0 IRRITABLE BOWEL SYNDROME WITH DIARRHEA: Status: ACTIVE | Noted: 2019-06-19

## 2022-02-04 PROBLEM — E11.9 CONTROLLED TYPE 2 DIABETES MELLITUS WITHOUT COMPLICATION, WITHOUT LONG-TERM CURRENT USE OF INSULIN (H): Status: ACTIVE | Noted: 2021-05-27

## 2022-02-04 PROBLEM — F41.1 GENERALIZED ANXIETY DISORDER: Status: ACTIVE | Noted: 2019-06-19

## 2022-02-04 LAB
TOTAL PROTEIN SERUM FOR ELP: 7.6 G/DL (ref 6.8–8.8)
VIT B12 SERPL-MCNC: >6000 PG/ML (ref 193–986)

## 2022-02-04 PROCEDURE — 84252 ASSAY OF VITAMIN B-2: CPT | Mod: 90 | Performed by: PATHOLOGY

## 2022-02-04 PROCEDURE — 86334 IMMUNOFIX E-PHORESIS SERUM: CPT | Performed by: PATHOLOGY

## 2022-02-04 PROCEDURE — 99000 SPECIMEN HANDLING OFFICE-LAB: CPT | Performed by: PATHOLOGY

## 2022-02-04 PROCEDURE — 82607 VITAMIN B-12: CPT | Performed by: PATHOLOGY

## 2022-02-04 PROCEDURE — 36415 COLL VENOUS BLD VENIPUNCTURE: CPT | Performed by: PATHOLOGY

## 2022-02-04 PROCEDURE — 84165 PROTEIN E-PHORESIS SERUM: CPT | Performed by: PATHOLOGY

## 2022-02-04 PROCEDURE — 84425 ASSAY OF VITAMIN B-1: CPT | Mod: 90 | Performed by: PATHOLOGY

## 2022-02-04 PROCEDURE — 84207 ASSAY OF VITAMIN B-6: CPT | Mod: 90 | Performed by: PATHOLOGY

## 2022-02-04 PROCEDURE — 96372 THER/PROPH/DIAG INJ SC/IM: CPT | Performed by: PSYCHIATRY & NEUROLOGY

## 2022-02-04 PROCEDURE — 99202 OFFICE O/P NEW SF 15 MIN: CPT | Mod: 25 | Performed by: PSYCHIATRY & NEUROLOGY

## 2022-02-04 PROCEDURE — 84155 ASSAY OF PROTEIN SERUM: CPT | Mod: 90 | Performed by: PATHOLOGY

## 2022-02-04 RX ORDER — SYRINGE-NEEDLE,INSULIN,0.5 ML 27GX1/2"
SYRINGE, EMPTY DISPOSABLE MISCELLANEOUS
Qty: 10 EACH | Refills: 11 | Status: SHIPPED | OUTPATIENT
Start: 2022-02-04 | End: 2023-03-21

## 2022-02-04 RX ORDER — HYDROXYCHLOROQUINE SULFATE 200 MG/1
1 TABLET, FILM COATED ORAL DAILY
COMMUNITY
Start: 2022-01-02 | End: 2022-10-14

## 2022-02-04 RX ORDER — CYANOCOBALAMIN 1000 UG/ML
1 INJECTION, SOLUTION INTRAMUSCULAR; SUBCUTANEOUS WEEKLY
Qty: 3 ML | Refills: 0 | Status: SHIPPED | OUTPATIENT
Start: 2022-02-04 | End: 2022-02-19

## 2022-02-04 RX ORDER — CYANOCOBALAMIN 1000 UG/ML
1000 INJECTION, SOLUTION INTRAMUSCULAR; SUBCUTANEOUS ONCE
Status: COMPLETED | OUTPATIENT
Start: 2022-02-04 | End: 2022-02-04

## 2022-02-04 RX ORDER — CYANOCOBALAMIN 1000 UG/ML
1 INJECTION, SOLUTION INTRAMUSCULAR; SUBCUTANEOUS
Qty: 1 ML | Refills: 11 | Status: SHIPPED | OUTPATIENT
Start: 2022-03-04 | End: 2023-03-21

## 2022-02-04 RX ADMIN — CYANOCOBALAMIN 1000 MCG: 1000 INJECTION, SOLUTION INTRAMUSCULAR; SUBCUTANEOUS at 14:10

## 2022-02-04 NOTE — LETTER
"2/4/2022       RE: Paola Couch  [address] [place] [id number]     Dear Colleague,    Thank you for referring your patient, Paola Couch, to the [facility] [place] at [facility]. Please see a copy of my visit note below.            Paola Couch MRN# [id number]   Age: 37 year old YOB: 1985     Requesting physician: Referred Self  Jhonny Ochoa            Assessment and Plan:     (G62.9) Peripheral polyneuropathy  (primary encounter diagnosis)  Comment: Bilateral lateral femoral cutaneous nerve injuries. Warrants further evaluation to see if there is underlying dysfunction that elevates her risk of worsening. I am concerned about her low B12 level despite oral supplementation with prenatal MVI. We will administer supplementation today IM.   Plan:   Vitamin B12, Protein electrophoresis, Protein  Immunofixation Serum, Vitamin B6, Vitamin B2,  Vitamin B1 whole blood              (E53.8) Vitamin B12 deficiency (non anemic)  Comment: 5/2021 level was 110. Goal > 400  Plan: cyanocobalamin injection 1,000 mcg,         cyanocobalamin (CYANOCOBALAMIN) 1000 MCG/ML         injection, cyanocobalamin (CYANOCOBALAMIN) 1000        MCG/ML injection, insulin syringe-needle U-100         (30G X 1/2\" 1 ML) 30G X 1/2\" 1 ML miscellaneous            Serena Trejo MD               History of Present Illness:     chief complaint:   Chief Complaint   Patient presents with     New Patient     [facility] NEW        Paola Couch is a 37 year old patient presenting for assessment of numbness.  She has had right thigh numbness for 6 months. Above the knee and lateral side of thigh. Gradually crept up at start. Reduced sensation with some pins and needles. Cold can trigger. She mainly notices if she touches it. Not particularly bothersome to her but she has long standing left thigh numbness diagnosed as meralgia paresthetica so with the " second leg involvement she thought she should have evaluation to make sure there isn't a bigger issue.    She sometimes gets tingling in finger tips with knitting. No other numbness. Sometimes she feels her  isn't as strong as she would like.  She can also get muscle cramps in hands, feet, calves. Since teenager this has been a problem. She eats bananas, takes magnesium and has a spray.     She has endometriosis and pelvic floor dysfunction. She has some incontinence of bowel and bladder attributed to that. She is undergoing fertility treatments. She takes a prenatal multivitamin and has been doing so for multiple years. Despite this labs reviewed (listed below) showed a low B12 level in May 2021. She also notes she has recently been placed on Metformin for Diabetes, having had several years of borderline fasting glucose levels.            Physical Exam:     /87   Pulse 112   SpO2 95%   General Appearance:  Awake and alert, no acute distress  Neurological Examination:    General Motor Survey: The patient has normal muscle bulk, tone and strength in all four extremities. No tremor present.   Reflexes: Upper and lower extremity reflexes are within normal limits and bilaterally symmetric. Plantar responses are flexor.  Coordination: Normal coordination of finger to nose and heel-knee-shin.   Gait: Normal gait. Romberg negative. Able to walk on toes, heels and tandem walk  Sensory: Bilateral sensory deficits in the lateral thigh regions, left more lateral and lower than right. Vibration, pinprick, light touch and joint position sense otherwise normal             Pertinent history/data     5/14/2021  Vitamin B12 110  Iron 70  Hb A1c 6.6  Vitamin D 24.9  TSH 1.59

## 2022-02-04 NOTE — PROGRESS NOTES
"        Paola Couch MRN# [id number]   Age: 37 year old YOB: 1985     Requesting physician: Referred Self  Jhonny Ochoa            Assessment and Plan:     (G62.9) Peripheral polyneuropathy  (primary encounter diagnosis)  Comment: Bilateral lateral femoral cutaneous nerve injuries. Warrants further evaluation to see if there is underlying dysfunction that elevates her risk of worsening. I am concerned about her low B12 level despite oral supplementation with prenatal MVI. We will administer supplementation today IM.   Plan:   Vitamin B12, Protein electrophoresis, Protein  Immunofixation Serum, Vitamin B6, Vitamin B2,  Vitamin B1 whole blood              (E53.8) Vitamin B12 deficiency (non anemic)  Comment: 5/2021 level was 110. Goal > 400  Plan: cyanocobalamin injection 1,000 mcg,         cyanocobalamin (CYANOCOBALAMIN) 1000 MCG/ML         injection, cyanocobalamin (CYANOCOBALAMIN) 1000        MCG/ML injection, insulin syringe-needle U-100         (30G X 1/2\" 1 ML) 30G X 1/2\" 1 ML miscellaneous            Serena Trejo MD               History of Present Illness:     chief complaint:   Chief Complaint   Patient presents with     New Patient     [facility] NEW        Paola Couch is a 37 year old patient presenting for assessment of numbness.  She has had right thigh numbness for 6 months. Above the knee and lateral side of thigh. Gradually crept up at start. Reduced sensation with some pins and needles. Cold can trigger. She mainly notices if she touches it. Not particularly bothersome to her but she has long standing left thigh numbness diagnosed as meralgia paresthetica so with the second leg involvement she thought she should have evaluation to make sure there isn't a bigger issue.    She sometimes gets tingling in finger tips with knitting. No other numbness. Sometimes she feels her  isn't as strong as she would like.  She can also get muscle cramps in hands, feet, calves. Since teenager this " has been a problem. She eats bananas, takes magnesium and has a spray.     She has endometriosis and pelvic floor dysfunction. She has some incontinence of bowel and bladder attributed to that. She is undergoing fertility treatments. She takes a prenatal multivitamin and has been doing so for multiple years. Despite this labs reviewed (listed below) showed a low B12 level in May 2021. She also notes she has recently been placed on Metformin for Diabetes, having had several years of borderline fasting glucose levels.            Physical Exam:     /87   Pulse 112   SpO2 95%   General Appearance:  Awake and alert, no acute distress  Neurological Examination:    General Motor Survey: The patient has normal muscle bulk, tone and strength in all four extremities. No tremor present.   Reflexes: Upper and lower extremity reflexes are within normal limits and bilaterally symmetric. Plantar responses are flexor.  Coordination: Normal coordination of finger to nose and heel-knee-shin.   Gait: Normal gait. Romberg negative. Able to walk on toes, heels and tandem walk  Sensory: Bilateral sensory deficits in the lateral thigh regions, left more lateral and lower than right. Vibration, pinprick, light touch and joint position sense otherwise normal             Pertinent history/data     5/14/2021  Vitamin B12 110  Iron 70  Hb A1c 6.6  Vitamin D 24.9  TSH 1.59

## 2022-02-07 LAB
ALBUMIN SERPL ELPH-MCNC: 4.6 G/DL (ref 3.7–5.1)
ALPHA1 GLOB SERPL ELPH-MCNC: 0.4 G/DL (ref 0.2–0.4)
ALPHA2 GLOB SERPL ELPH-MCNC: 0.9 G/DL (ref 0.5–0.9)
B-GLOBULIN SERPL ELPH-MCNC: 0.9 G/DL (ref 0.6–1)
GAMMA GLOB SERPL ELPH-MCNC: 0.8 G/DL (ref 0.7–1.6)
M PROTEIN SERPL ELPH-MCNC: 0 G/DL
PROT PATTERN SERPL ELPH-IMP: NORMAL
PROT PATTERN SERPL IFE-IMP: NORMAL

## 2022-02-08 LAB
PYRIDOXAL PHOS SERPL-SCNC: 187.8 NMOL/L
VIT B1 PYROPHOSHATE BLD-SCNC: 206 NMOL/L

## 2022-02-10 LAB — VIT B2 SERPL-MCNC: 12 MCG/L (ref 1–19)

## 2022-03-26 ENCOUNTER — HEALTH MAINTENANCE LETTER (OUTPATIENT)
Age: 37
End: 2022-03-26

## 2022-05-21 ENCOUNTER — HEALTH MAINTENANCE LETTER (OUTPATIENT)
Age: 37
End: 2022-05-21

## 2022-07-10 ENCOUNTER — HEALTH MAINTENANCE LETTER (OUTPATIENT)
Age: 37
End: 2022-07-10

## 2022-09-11 ENCOUNTER — HEALTH MAINTENANCE LETTER (OUTPATIENT)
Age: 37
End: 2022-09-11

## 2022-10-14 ENCOUNTER — OFFICE VISIT (OUTPATIENT)
Dept: NEUROLOGY | Facility: CLINIC | Age: 37
End: 2022-10-14
Payer: COMMERCIAL

## 2022-10-14 VITALS — HEART RATE: 86 BPM | SYSTOLIC BLOOD PRESSURE: 119 MMHG | DIASTOLIC BLOOD PRESSURE: 80 MMHG | OXYGEN SATURATION: 98 %

## 2022-10-14 DIAGNOSIS — E53.8 VITAMIN B12 DEFICIENCY (NON ANEMIC): Primary | ICD-10-CM

## 2022-10-14 DIAGNOSIS — G62.9 PERIPHERAL POLYNEUROPATHY: ICD-10-CM

## 2022-10-14 PROCEDURE — 99213 OFFICE O/P EST LOW 20 MIN: CPT | Performed by: PSYCHIATRY & NEUROLOGY

## 2022-10-14 RX ORDER — ONDANSETRON 4 MG/1
TABLET, FILM COATED ORAL
COMMUNITY
Start: 2022-09-21 | End: 2023-03-21

## 2022-10-14 RX ORDER — METFORMIN HYDROCHLORIDE 750 MG/1
1500 TABLET, EXTENDED RELEASE ORAL
COMMUNITY
Start: 2022-09-29 | End: 2023-07-11 | Stop reason: DRUGHIGH

## 2022-10-14 ASSESSMENT — PAIN SCALES - GENERAL: PAINLEVEL: MODERATE PAIN (5)

## 2022-10-14 NOTE — PROGRESS NOTES
[facility]    Paola Couch MRN# [id number]   Age: 37 year old YOB: 1985     Requesting physician: Referred Self  Jhonny Ochoa            Assessment and Plan:     (E53.8) Vitamin B12 deficiency (non anemic)  (primary encounter diagnosis)  Comment: We will repeat a B12 level now she is no longer on supplementation.  Goal is to have the level over 400  Plan: Vitamin B12            (G62.9) Peripheral polyneuropathy  Comment: Repeat B12.  Avoid excess B6 in the future.      I will call with lab results.             History of Present Illness:   CC: Issac Guevara is a 37-year-old woman who follows up after 6 months for some painful paresthesias in her legs.  She reports that she did take B12 for a while and she thought it helped but has not taken it for several months.  When we checked her B12 it was actually high but it was shortly after giving herself additional B12 injection.  She still has pain and numbness in her lateral thighs.  She is undergoing lots of fertility treatments.  She struggles with endometriosis and is currently heading into using Lupron to control the endometriosis.  She is actually hopeful this might help some of the paresthesias in her legs.             Physical Exam:   /80   Pulse 86   SpO2 98%   The patient is awake and alert.  She is oriented.  No aphasia or dysarthria.  Cranial nerves II through XII are intact.  Gait examination is stable.    Labs  Component      Latest Ref Rng & Units 2/4/2022   Albumin Fraction      3.7 - 5.1 g/dL 4.6   Alpha 1 Fraction      0.2 - 0.4 g/dL 0.4   Alpha 2 Fraction      0.5 - 0.9 g/dL 0.9   Beta Fraction      0.6 - 1.0 g/dL 0.9   Gamma Fraction      0.7 - 1.6 g/dL 0.8   Monoclonal Peak      <=0.0 g/dL 0.0   ELP Interpretation:       Essentially normal electrophoretic pattern. No obvious monoclonal proteins seen. Pathologic significance requires clinical correlation. FARIDA Durham M.D., Ph.D., Pathologist ().    Vitamin B12      193 - 986 pg/mL >6,000 (H)   Immunofixation ELP       No monoclonal protein seen on immunofixation. Pathologic significance requires clinical correlation.  FARIDA Durham M.D., Ph.D., Pathologist ()   Vitamin B6      20.0 - 125.0 nmol/L 187.8 (H)   Vitamin B2      1 - 19 mcg/L 12   Vitamin B1 Whole Blood Level      70 - 180 nmol/L 206 (H)   Total Protein Serum for ELP      6.8 - 8.8 g/dL 7.6

## 2022-10-14 NOTE — NURSING NOTE
Chief Complaint   Patient presents with     RECHECK     RETURN - 4 MO. F/U     Doroteo Kimbrough

## 2022-10-14 NOTE — LETTER
10/14/2022       RE: Paola Couch  [address] [place] [id number]     Dear Colleague,    Thank you for referring your patient, Paola Couch, to the [facility]UROLOGY [facility] [place] at [facility]. Please see a copy of my visit note below.         Physicians    Paola Couch MRN# [id number]   Age: 37 year old YOB: 1985     Requesting physician: Referred Self  Jhonny Ochoa            Assessment and Plan:     (E53.8) Vitamin B12 deficiency (non anemic)  (primary encounter diagnosis)  Comment: We will repeat a B12 level now she is no longer on supplementation.  Goal is to have the level over 400  Plan: Vitamin B12            (G62.9) Peripheral polyneuropathy  Comment: Repeat B12.  Avoid excess B6 in the future.      I will call with lab results.             History of Present Illness:   CC: Recheck    Paola is a 37-year-old woman who follows up after 6 months for some painful paresthesias in her legs.  She reports that she did take B12 for a while and she thought it helped but has not taken it for several months.  When we checked her B12 it was actually high but it was shortly after giving herself additional B12 injection.  She still has pain and numbness in her lateral thighs.  She is undergoing lots of fertility treatments.  She struggles with endometriosis and is currently heading into using Lupron to control the endometriosis.  She is actually hopeful this might help some of the paresthesias in her legs.             Physical Exam:   /80   Pulse 86   SpO2 98%   The patient is awake and alert.  She is oriented.  No aphasia or dysarthria.  Cranial nerves II through XII are intact.  Gait examination is stable.    Labs  Component      Latest Ref Rng & Units 2/4/2022   Albumin Fraction      3.7 - 5.1 g/dL 4.6   Alpha 1 Fraction      0.2 - 0.4 g/dL 0.4   Alpha 2 Fraction      0.5 - 0.9 g/dL 0.9   Beta Fraction       0.6 - 1.0 g/dL 0.9   Gamma Fraction      0.7 - 1.6 g/dL 0.8   Monoclonal Peak      <=0.0 g/dL 0.0   ELP Interpretation:       Essentially normal electrophoretic pattern. No obvious monoclonal proteins seen. Pathologic significance requires clinical correlation. FARIDA Durham M.D., Ph.D., Pathologist ().   Vitamin B12      193 - 986 pg/mL >6,000 (H)   Immunofixation ELP       No monoclonal protein seen on immunofixation. Pathologic significance requires clinical correlation.  FARIDA Durham M.D., Ph.D., Pathologist ()   Vitamin B6      20.0 - 125.0 nmol/L 187.8 (H)   Vitamin B2      1 - 19 mcg/L 12   Vitamin B1 Whole Blood Level      70 - 180 nmol/L 206 (H)   Total Protein Serum for ELP      6.8 - 8.8 g/dL 7.6       Sincerely,    Serena Trejo MD

## 2023-01-09 DIAGNOSIS — N80.9 ENDOMETRIOSIS: ICD-10-CM

## 2023-01-09 NOTE — TELEPHONE ENCOUNTER
"Requested Prescriptions   Pending Prescriptions Disp Refills     norethindrone-ethinyl estradiol (JUNEL FE 1/20) 1-20 MG-MCG tablet 112 tablet 3     Sig: TAKE 1 ACTIVE TABLET BY MOUTH DAILY FOR CONTINUOUS SUPPRESSION       Contraceptives Protocol Failed - 1/9/2023 11:57 AM        Failed - Recent (12 mo) or future (30 days) visit within the authorizing provider's specialty     Patient has had an office visit with the authorizing provider or a provider within the authorizing providers department within the previous 12 mos or has a future within next 30 days. See \"Patient Info\" tab in inbasket, or \"Choose Columns\" in Meds & Orders section of the refill encounter.              Passed - Patient is not a current smoker if age is 35 or older        Passed - Medication is active on med list        Passed - No active pregnancy on record        Passed - No positive pregnancy test in past 12 months             Last Written Prescription Date:  12/8/2021  Last Fill Quantity: 112,  # refills: 3   Last office visit: 12/8/2021 with prescribing provider:  Wilbur   Future Office Visit:  None      Medication is being filled for 1 time refill only due to:  Patient needs to be seen because it has been more than one year since last visit.  Karina Falcon RN on 1/10/2023 at 8:47 AM      "

## 2023-01-10 RX ORDER — NORETHINDRONE ACETATE AND ETHINYL ESTRADIOL 1MG-20(21)
KIT ORAL
Qty: 112 TABLET | Refills: 0 | Status: SHIPPED | OUTPATIENT
Start: 2023-01-10

## 2023-01-23 ENCOUNTER — HEALTH MAINTENANCE LETTER (OUTPATIENT)
Age: 38
End: 2023-01-23

## 2023-01-31 NOTE — PROGRESS NOTES
Patient: Breezy Tijerina    Procedure: Procedure(s):  PHACOEMULSIFICATION, CATARACT, WITH INTRAOCULAR LENS IMPLANT, TORIC LENS       Diagnosis: Cortical age-related cataract of both eyes [H25.013]  Combined forms of age-related cataract of both eyes [H25.813]  Diagnosis Additional Information: No value filed.    Anesthesia Type:   MAC     Note:    Oropharynx: oropharynx clear of all foreign objects and spontaneously breathing  Level of Consciousness: awake  Oxygen Supplementation: room air    Independent Airway: airway patency satisfactory and stable  Dentition: dentition unchanged  Vital Signs Stable: post-procedure vital signs reviewed and stable  Report to RN Given: handoff report given  Patient transferred to: Phase II    Handoff Report: Identifed the Patient, Identified the Reponsible Provider, Reviewed the pertinent medical history, Discussed the surgical course, Reviewed Intra-OP anesthesia mangement and issues during anesthesia, Set expectations for post-procedure period and Allowed opportunity for questions and acknowledgement of understanding      Vitals:  Vitals Value Taken Time   BP     Temp     Pulse     Resp     SpO2         Electronically Signed By: ROXY Blanco CRNA  January 31, 2023  10:33 AM   Patient is seen for a postoperative check.  She had a laparoscopy with lysis of adhesion and vaporization of widespread endometriosis.  Her incisions have healed well.  She is attempting pregnancy at this time and will test for ovulation.  She will return to see us in 3 months.  Her left tube looked okay but there must be some internal scarring on the right although the distal tube looked fine.

## 2023-03-21 ENCOUNTER — OFFICE VISIT (OUTPATIENT)
Dept: OBGYN | Facility: CLINIC | Age: 38
End: 2023-03-21
Payer: COMMERCIAL

## 2023-03-21 VITALS
HEIGHT: 64 IN | WEIGHT: 236.6 LBS | DIASTOLIC BLOOD PRESSURE: 78 MMHG | SYSTOLIC BLOOD PRESSURE: 122 MMHG | BODY MASS INDEX: 40.39 KG/M2

## 2023-03-21 DIAGNOSIS — R10.2 PELVIC PAIN IN FEMALE: ICD-10-CM

## 2023-03-21 DIAGNOSIS — N80.9 ENDOMETRIOSIS: Primary | ICD-10-CM

## 2023-03-21 PROCEDURE — 99214 OFFICE O/P EST MOD 30 MIN: CPT | Performed by: OBSTETRICS & GYNECOLOGY

## 2023-03-21 RX ORDER — LANCETS 28 GAUGE
EACH MISCELLANEOUS
COMMUNITY
Start: 2023-01-18

## 2023-03-21 RX ORDER — L. ACIDOPHILUS/BIFIDO. LONGUM 15 MG
CAPSULE,DELAYED RELEASE (ENTERIC COATED) ORAL
COMMUNITY
Start: 2023-01-26

## 2023-03-21 RX ORDER — BLOOD-GLUCOSE METER
KIT MISCELLANEOUS
COMMUNITY
Start: 2023-01-27

## 2023-03-21 RX ORDER — METFORMIN HYDROCHLORIDE 750 MG/1
1500 TABLET, EXTENDED RELEASE ORAL
COMMUNITY
Start: 2023-01-18 | End: 2023-03-21

## 2023-03-21 RX ORDER — ATOMOXETINE 40 MG/1
CAPSULE ORAL
COMMUNITY
Start: 2023-01-18

## 2023-03-29 ENCOUNTER — ANCILLARY PROCEDURE (OUTPATIENT)
Dept: ULTRASOUND IMAGING | Facility: CLINIC | Age: 38
End: 2023-03-29
Attending: OBSTETRICS & GYNECOLOGY
Payer: COMMERCIAL

## 2023-03-29 ENCOUNTER — VIRTUAL VISIT (OUTPATIENT)
Dept: OBGYN | Facility: CLINIC | Age: 38
End: 2023-03-29
Attending: OBSTETRICS & GYNECOLOGY
Payer: COMMERCIAL

## 2023-03-29 DIAGNOSIS — R10.2 PELVIC PAIN IN FEMALE: ICD-10-CM

## 2023-03-29 DIAGNOSIS — N80.9 ENDOMETRIOSIS: Primary | ICD-10-CM

## 2023-03-29 DIAGNOSIS — N80.9 ENDOMETRIOSIS: ICD-10-CM

## 2023-03-29 PROCEDURE — 76830 TRANSVAGINAL US NON-OB: CPT | Performed by: OBSTETRICS & GYNECOLOGY

## 2023-03-29 PROCEDURE — 76856 US EXAM PELVIC COMPLETE: CPT | Performed by: OBSTETRICS & GYNECOLOGY

## 2023-03-29 PROCEDURE — 99213 OFFICE O/P EST LOW 20 MIN: CPT | Mod: 93 | Performed by: OBSTETRICS & GYNECOLOGY

## 2023-03-29 NOTE — PROGRESS NOTES
Paoal Couch is a 38 year old female who is being evaluated via a billable telephone visit.      What phone number would you like to be contacted at? [phone]  How would you like to obtain your AVS? MyChart    Originating Location (pt. Location): home      Distant Location (provider location):  On-site    SUBJECTIVE:                                                   Paola Couch is a 38 year old female who presents for virtual visit today for the following health issue(s):  Patient presents with:  Ultrasound: F/u to pelvic pain and re-evaluate anatomy. Discuss getting Lupron injections.    HPI:  Patient is having follow-up visit to review her US prior to starting Lupron.  She [place] and they have requested 3 month suppression with Lupron prior to starting fertility treatments due to history of endometriosis.   Patient has no concerns today.    No LMP recorded. (Menstrual status: Birth Control).    Patient is sexually active, .  Using oral contraceptives for contraception.    reports that she has quit smoking. She has never used smokeless tobacco.    Health maintenance updated:  yes    Today's PHQ-2 Score:   PHQ-2 (  [employer]) 3/21/2023   Q1: Little interest or pleasure in doing things 0   Q2: Feeling down, depressed or hopeless 0   PHQ-2 Score 0   PHQ-2 Total Score (12-17 Years)- Positive if 3 or more points; Administer PHQ-A if positive -     Today's PHQ-9 Score:   PHQ-9 SCORE 8/15/2019   PHQ-9 Total Score 15     Today's RAUDEL-7 Score:   RAUDEL-7 SCORE 2019   Total Score 9       Problem list and histories reviewed & adjusted, as indicated.  Additional history: as documented.    Patient Active Problem List   Diagnosis     Morbid obesity (H)     Controlled type 2 diabetes mellitus without complication, without long-term current use of insulin (H)     Irritable bowel syndrome with diarrhea     Generalized anxiety disorder     Attention deficit  hyperactivity disorder (ADHD)     Endometriosis     Past Surgical History:   Procedure Laterality Date     GYN SURGERY  2018    laparoscopy for endometriosis     HEAD & NECK SURGERY  age 15    tooth extraction     LAPAROSCOPIC MYOMECTOMY UTERUS N/A 11/9/2021    Procedure: Laparoscopy lysis of adhesions;  Surgeon: Boyd Bowles MD;  Location:  OR     LASER CO2 LAPAROSCOPIC VAPORIZATION ENDOMETRIUM N/A 4/30/2019    Procedure: LAPAROSCOPY WITH CO2 LASER;  Surgeon: Boyd Bowles MD;  Location:  OR     LASER CO2 LAPAROSCOPY DIAGNOSTIC, LYSIS ADHESIONS, COMBINED N/A 11/9/2021    Procedure: with Co2 Laser;  Surgeon: Boyd Bowles MD;  Location:  OR      Social History     Tobacco Use     Smoking status: Former     Smokeless tobacco: Never   Substance Use Topics     Alcohol use: Yes     Comment: occasionally      Problem (# of Occurrences) Relation (Name,Age of Onset)    Diabetes (2) Maternal Grandmother, Maternal Grandfather            Current Outpatient Medications   Medication Sig     atomoxetine (STRATTERA) 40 MG capsule      blood glucose ([employer] BLOOD GLUCOSE TEST) test strip      blood glucose monitoring (FREESTYLE) lancets      CALCIUM-MAGNESIUM-ZINC PO Take 1 tablet by mouth daily     cholecalciferol (VITAMIN D3) 5000 units TABS tablet Take 5,000 Units by mouth daily     Coenzyme Q10 (COQ10 PO) Take 100 mg by mouth daily      escitalopram (LEXAPRO) 5 MG tablet Take 10 mg by mouth every morning      FREESTYLE LITE test strip      magnesium 200 MG TABS Take 200 mg by mouth     metFORMIN (GLUCOPHAGE-XR) 750 MG 24 hr tablet Take 1,500 mg by mouth     norethindrone-ethinyl estradiol (JUNEL FE 1/20) 1-20 MG-MCG tablet TAKE 1 ACTIVE TABLET BY MOUTH DAILY FOR CONTINUOUS SUPPRESSION     Omega-3 Fatty Acids (FISH OIL PO) Take 900 mg by mouth daily      Prenatal Vit-Fe Fumarate-FA (PRENATAL VITAMIN PO) Take 1 tablet by mouth daily Joseph Light     UNABLE TO FIND MEDICATION NAME: alpha lopic acid, one tablet  daily     Current Facility-Administered Medications   Medication     [START ON 4/4/2023] leuprolide (LUPRON DEPOT) kit 3.75 mg     No Known Allergies    Results for orders placed or performed in visit on 03/29/23   US Pelvic Transabdominal and Transvaginal     Status: None    Narrative    Table formatting from the original result was not included.  US Pelvic Transabdominal and Transvaginal  Order #: [id number] Accession #: [id number]  Study Notes       Chrystal Manning on 3/29/2023 11:36 AM      Gynecological Ultrasound Report  Pelvic U/S - Transabdominal and Transvaginal  [facility]  Referring Provider: Kasia Tan MD  Sonographer:  Chrystal Manning RDMS  Indication: Endometriosis  LMP: No LMP recorded. (Menstrual status: Birth Control).  History:   Gynecological Ultrasonography:   Uterus: anteverted. Contour is irregular w/ myomata: 1 Right Fundal 3.59 x   2.54 x 3.45 cm.  Size: 7.68 x 4.54 x 4.04 cm  Endometrium: Thickness Total 8.05 mm  Findings:   Right Ovary: 2.43 x 3.14 x 1.52 cm. Wnl  Left Ovary: 1.63 x 1.60 x 1.25 cm. Wnl  Cul de Sac Free Fluid: No free fluid     Impression:       Small right fundal fibroid as described above.  Normal endometrium  Normal ovaries.    Kasia Tan MD         OBJECTIVE:     No vitals were obtained today due to virtual visit.    Physical Exam  GENERAL: Healthy, alert and no distress  RESP: No audible wheeze, cough, or increased work of breathing.    NEURO: Mentation and speech appropriate for age.  PSYCH: Mentation appears normal, affect normal/bright, judgement and insight intact, normal speech      ASSESSMENT/PLAN:                                                      Phone call duration: 10 minutes  5 additional minutes were spent on the date of the encounter doing chart review, review of outside records, review and interpretation of pertinent test results, history and exam, documentation, patient counseling, and further activities as noted above in  addition to time spent on phone with patient.        ICD-10-CM    1. Endometriosis  N80.9         Reviewed US was normal.  Okay to start Lupron  Reviewed Lupron and side effects.  Patient will follow-up in clinic for nurse only visit to begin injections.    Kasia Tan MD  [facility]

## 2023-04-03 ENCOUNTER — ALLIED HEALTH/NURSE VISIT (OUTPATIENT)
Dept: NURSING | Facility: CLINIC | Age: 38
End: 2023-04-03
Payer: COMMERCIAL

## 2023-04-03 DIAGNOSIS — N80.9 ENDOMETRIOSIS: Primary | ICD-10-CM

## 2023-04-03 PROCEDURE — 99207 PR NO CHARGE NURSE ONLY: CPT

## 2023-04-03 PROCEDURE — 96372 THER/PROPH/DIAG INJ SC/IM: CPT | Performed by: OBSTETRICS & GYNECOLOGY

## 2023-04-03 NOTE — PROGRESS NOTES
Clinic Administered Medication Documentation      Injectable Medication Documentation    Is there an active order (written within the past 365 days, with administrations remaining, not ) in the chart? Yes.     Patient was given Lupron Depot. Prior to medication administration, verified patient's identity using patient s name and date of birth. Please see MAR and medication order for additional information. Patient instructed to remain in clinic for 15 minutes and report any adverse reaction to staff immediately.    Vial/Syringe: Syringe  Was this medication supplied by the patient? No  Is this a medication the patient will need to receive again? Yes. Verified that the patient has refills remaining in their prescription.      Halley Guzman RN on 4/3/2023 at 2:16 PM

## 2023-05-05 ENCOUNTER — ALLIED HEALTH/NURSE VISIT (OUTPATIENT)
Dept: NURSING | Facility: CLINIC | Age: 38
End: 2023-05-05
Payer: COMMERCIAL

## 2023-05-05 DIAGNOSIS — N80.9 ENDOMETRIOSIS: Primary | ICD-10-CM

## 2023-05-05 PROCEDURE — 96372 THER/PROPH/DIAG INJ SC/IM: CPT | Performed by: OBSTETRICS & GYNECOLOGY

## 2023-05-05 PROCEDURE — 99207 PR NO CHARGE NURSE ONLY: CPT

## 2023-05-05 NOTE — NURSING NOTE
Clinic Administered Medication Documentation      Injectable Medication Documentation    Is there an active order (written within the past 365 days, with administrations remaining, not ) in the chart? Yes.     Patient was given Lupron Depot 3.75 mg IM. Prior to medication administration, verified patient's identity using patient s name and date of birth. Please see MAR and medication order for additional information. Patient instructed to remain in clinic for 15 minutes and report any adverse reaction to staff immediately.    Vial/Syringe: Syringe  Was this medication supplied by the patient? No  Is this a medication the patient will need to receive again? Yes. Verified that the patient has refills remaining in their prescription.    Patient tolerated injection without incident and discharged home.    Karina Falcon RN on 2023 at 1:12 PM

## 2023-05-06 ENCOUNTER — HEALTH MAINTENANCE LETTER (OUTPATIENT)
Age: 38
End: 2023-05-06

## 2023-05-08 DIAGNOSIS — R69 DIAGNOSIS UNKNOWN: Primary | ICD-10-CM

## 2023-06-15 ENCOUNTER — ALLIED HEALTH/NURSE VISIT (OUTPATIENT)
Dept: OBGYN | Facility: CLINIC | Age: 38
End: 2023-06-15
Payer: COMMERCIAL

## 2023-06-15 DIAGNOSIS — N80.9 ENDOMETRIOSIS: Primary | ICD-10-CM

## 2023-06-15 PROCEDURE — 96375 TX/PRO/DX INJ NEW DRUG ADDON: CPT

## 2023-06-15 PROCEDURE — 96372 THER/PROPH/DIAG INJ SC/IM: CPT | Performed by: OBSTETRICS & GYNECOLOGY

## 2023-06-15 PROCEDURE — 99207 PR NO CHARGE NURSE ONLY: CPT

## 2023-06-15 NOTE — NURSING NOTE
Clinic Administered Medication Documentation      Injectable Medication Documentation    Is there an active order (written within the past 365 days, with administrations remaining, not ) in the chart? Yes.     Patient was given Lupron Depot 3.75mg. Prior to medication administration, verified patient's identity using patient s name and date of birth. Please see MAR and medication order for additional information. Patient instructed to remain in clinic for 15 minutes and report any adverse reaction to staff immediately.    Vial/Syringe: Syringe  Was this medication supplied by the patient? No  Is this a medication the patient will need to receive again? Yes.     Verified that the patient has refills remaining in their prescription.  Patient tolerated injection without incident and discharged home.   Karina Falcon RN on 6/15/2023 at 8:44 AM

## 2023-07-05 ENCOUNTER — TRANSCRIBE ORDERS (OUTPATIENT)
Dept: OTHER | Age: 38
End: 2023-07-05

## 2023-07-05 DIAGNOSIS — K62.5 RECTAL BLEEDING: Primary | ICD-10-CM

## 2023-07-05 DIAGNOSIS — K58.0 IRRITABLE BOWEL SYNDROME WITH DIARRHEA: ICD-10-CM

## 2023-07-06 NOTE — TELEPHONE ENCOUNTER
REFERRAL INFORMATION:    Referring Provider:  Jennie Lam    Referring Clinic:  [facility]  Reason for Visit/Diagnosis:   : Rectal bleeding   Irritable bowel syndrome with diarrhea      FUTURE VISIT INFORMATION:    Appointment Date: 7/11/2023    Appointment Time:      NOTES STATUS DETAILS   OFFICE NOTE from Referring Provider Care Everywhere 7/6/2023, 6/30/2023 OV with VIVIEN Lam   OFFICE NOTE from Other Specialist N/A    HOSPITAL DISCHARGE SUMMARY/  ED VISITS N/A    OPERATIVE REPORT N/A    MEDICATION LIST N/A         ENDOSCOPY  N/A    COLONOSCOPY Care Everywhere 6/4/2019 - [place]

## 2023-07-07 NOTE — PROGRESS NOTES
"GASTROENTEROLOGY NEW PATIENT VIRTUAL VISIT      How would you like to obtain your AVS? MyChart  If the video visit is dropped, the invitation should be resent by: Text to cell phone: [phone]  Will anyone else be joining your video visit? No  ..    Video-Visit Details    Type of service:  Video Visit    Video Start Time (time video started): 1521    Video End Time (time video stopped): 1544    Originating Location (pt. Location): Home        Distant Location (provider location):  Off-site    Mode of Communication:  Video Conference via [employer]    Physician has received verbal consent for a Video Visit from the patient? Yes    Gastroenterology CLINIC VISIT, NEW PATIENT    CC/REFERRING PROVIDER: Jennie Lam  REASON FOR CONSULTATION:  IBS-D, rectal bleeding    HPI: 38 year old female was referred  to GI clinic for a consult on changes in bowel habits and rectal bleeding. Patient stated that she was diagnosed with IBS-D about 5 years ago. Stated that she has been having abdominal pain and bloating for many years. Admits to intermittent acid reflux and sensation that her \"food is not getting digested\": noted some early satiety. Feeling better when skips meals and not eat before bed. Reported nausea and rare emesis, usually related to changes in menstrual cycle. Takes Zofran and compazine as needed. Patient reported history of endometriosis and infertility treatment. Stated that after her first surgery for endometriosis, her abdominal symptoms had improved.   Said that she started having alternation of diarrhea and constipation approximately 2.5 months ago. Developed fecal urgency with episodes of incontinence. Complains of fatigue.   Patient denies current smoking and alcohol use. No illicit drug or NSAIDs use.     PREVIOUS ENDOSCOPY:  2019 Colonoscopy  Hemorrhoids. Otherwise, normal study.    PERTINENT STUDIES Reviewed in EMR    ROS: 10pt ROS performed and otherwise negative.    PAST MEDICAL HISTORY:  Past " Medical History:   Diagnosis Date     Anxiety      Complication of anesthesia     panic attack     COVID-19      Diabetes (H)      Endometriosis      HPV in female 2014     IBS (irritable bowel syndrome)        PREVIOUS ABDOMINAL/GYNECOLOGIC SURGERIES:    Past Surgical History:   Procedure Laterality Date     GYN SURGERY  2018    laparoscopy for endometriosis     HEAD & NECK SURGERY  age 15    tooth extraction     LAPAROSCOPIC MYOMECTOMY UTERUS N/A 11/9/2021    Procedure: Laparoscopy lysis of adhesions;  Surgeon: Boyd Bowles MD;  Location: [facility] OR     LASER CO2 LAPAROSCOPIC VAPORIZATION ENDOMETRIUM N/A 4/30/2019    Procedure: LAPAROSCOPY WITH CO2 LASER;  Surgeon: Boyd Bowles MD;  Location: [facility] OR     LASER CO2 LAPAROSCOPY DIAGNOSTIC, LYSIS ADHESIONS, COMBINED N/A 11/9/2021    Procedure: with Co2 Laser;  Surgeon: Boyd Bowles MD;  Location:  OR         PERTINENT MEDICATIONS:  Current Outpatient Medications   Medication Sig Dispense Refill     atomoxetine (STRATTERA) 40 MG capsule        blood glucose ([employer] BLOOD GLUCOSE TEST) test strip        blood glucose monitoring (FREESTYLE) lancets        CALCIUM-MAGNESIUM-ZINC PO Take 1 tablet by mouth daily       cholecalciferol (VITAMIN D3) 5000 units TABS tablet Take 5,000 Units by mouth daily       Coenzyme Q10 (COQ10 PO) Take 100 mg by mouth daily        escitalopram (LEXAPRO) 5 MG tablet Take 10 mg by mouth every morning        FREESTYLE LITE test strip        magnesium 200 MG TABS Take 200 mg by mouth       metFORMIN (GLUCOPHAGE-XR) 750 MG 24 hr tablet Take 1,500 mg by mouth       norethindrone-ethinyl estradiol (JUNEL FE 1/20) 1-20 MG-MCG tablet TAKE 1 ACTIVE TABLET BY MOUTH DAILY FOR CONTINUOUS SUPPRESSION 112 tablet 0     Omega-3 Fatty Acids (FISH OIL PO) Take 900 mg by mouth daily        Prenatal Vit-Fe Fumarate-FA (PRENATAL VITAMIN PO) Take 1 tablet by mouth daily Makaweli Light       UNABLE TO FIND MEDICATION NAME: alpha lopic acid, one tablet  daily         No other OTC/herbal/supplements reported by patient.    SOCIAL HISTORY:  Social History     Socioeconomic History     Marital status:      Spouse name: Not on file     Number of children: Not on file     Years of education: Not on file     Highest education level: Not on file   Occupational History     Not on file   Tobacco Use     Smoking status: Former     Smokeless tobacco: Never   Vaping Use     Vaping Use: Never used   Substance and Sexual Activity     Alcohol use: Yes     Comment: occasionally     Drug use: Never     Sexual activity: Yes     Partners: Male     Birth control/protection: Pill   Other Topics Concern     Parent/sibling w/ CABG, MI or angioplasty before 65F 55M? Not Asked   Social History Narrative     Not on file     Social Determinants of Health     Financial Resource Strain: Not on file   Food Insecurity: Not on file   Transportation Needs: Not on file   Physical Activity: Not on file   Stress: Not on file   Social Connections: Not on file   Intimate Partner Violence: Not on file   Housing Stability: Not on file       FAMILY HISTORY:  Denies colon/panc/esophageal/other GI CA, no other Hernandez or other HPS-related [place]. No IBD/celiac, no other AI/liver/thyroid disease.    Family History   Problem Relation Age of Onset     Diabetes Maternal Grandmother      Diabetes Maternal Grandfather        PHYSICAL EXAMINATION:  Vitals reviewed  There were no vitals taken for this visit.    General: Patient appears well in no acute distress.   Skin: No visualized rash or lesions on visualized skin  Eyes: EOMI, no erythema, sclera icterus or discharge noted  Resp: breathing comfortably without accessory muscle usage, speaking in full sentences, no cough.   MSK: Appears to have normal range of motion based on visualized movements  Neurologic: No apparent tremors, facial movements symmetric  Psych: affect normal, alert and oriented      ASSESSMENT/PLAN:  38 year old female  presented  to GI  clinic for a consultation on worsening in IBS symptoms. Reported changes in stool pattern: used to have diarrhea for many years but now, her diarrhea alternates with constipation. Complains of a new fecal urgency and incontinence. New symptoms of indigestion and early satiety. Stated that her generalized abdominal pain is getting worse.  Noted unremarkable colonoscopy in 2019, except of hemorrhoids. Reviewed last lab work showing high A1c of 10.3 %. Normal thyroid function. Normal iron panel, electrolytes, and renal function.  We discussed differential diagnosis and potential etiology of changes in bowel habits, which is likely multifactorial. I explained correlation between high blood sugar and GI symptoms.  Recommended the following:  - CT scan of abdomen/pelvis;  - Gastric emptying study;  - Start a fiber supplement;  - encouraged to work with her PCP on improvement of diabetes control.  - a trial of low FODMAP diet for a few weeks  - loperamide as needed for diarrhea.  If studies come back normal, will focus on IBS management.      ICD-10-CM    1. Irritable bowel syndrome with both constipation and diarrhea  K58.2 CT Abdomen w/o & w Contrast      2. Hemorrhoids, unspecified hemorrhoid type  K64.9       3. Dyspepsia  R10.13 CT Abdomen w/o & w Contrast     NM Gastric Emptying      4. Abdominal bloating  R14.0 CT Abdomen w/o & w Contrast     NM Gastric Emptying          Patient verbalized understanding and appreciation of care provided. Stated that all of the questions were answered to her/his satisfaction.    RTC in one month    Thank you for this consultation. It was a pleasure to participate in the care of this patient; please contact us with any further questions.    DANIE Arteaga, FNP-C  [facility]  Gastroenterology Department  [place], [place]    This note was created with Dragon voice recognition software, and while reviewed for accuracy, inadvertent minor typographic errors may occur. Please  contact the provider if you have any questions.

## 2023-07-10 ENCOUNTER — MYC MEDICAL ADVICE (OUTPATIENT)
Dept: GASTROENTEROLOGY | Facility: CLINIC | Age: 38
End: 2023-07-10
Payer: COMMERCIAL

## 2023-07-11 ENCOUNTER — PRE VISIT (OUTPATIENT)
Dept: GASTROENTEROLOGY | Facility: CLINIC | Age: 38
End: 2023-07-11

## 2023-07-11 ENCOUNTER — VIRTUAL VISIT (OUTPATIENT)
Dept: GASTROENTEROLOGY | Facility: CLINIC | Age: 38
End: 2023-07-11
Attending: FAMILY MEDICINE
Payer: COMMERCIAL

## 2023-07-11 DIAGNOSIS — K58.2 IRRITABLE BOWEL SYNDROME WITH BOTH CONSTIPATION AND DIARRHEA: Primary | ICD-10-CM

## 2023-07-11 DIAGNOSIS — R14.0 ABDOMINAL BLOATING: ICD-10-CM

## 2023-07-11 DIAGNOSIS — K64.9 HEMORRHOIDS, UNSPECIFIED HEMORRHOID TYPE: ICD-10-CM

## 2023-07-11 DIAGNOSIS — R10.13 DYSPEPSIA: ICD-10-CM

## 2023-07-11 PROCEDURE — 99204 OFFICE O/P NEW MOD 45 MIN: CPT | Mod: VID | Performed by: NURSE PRACTITIONER

## 2023-07-11 RX ORDER — METFORMIN HCL 500 MG
2000 TABLET, EXTENDED RELEASE 24 HR ORAL 2 TIMES DAILY WITH MEALS
COMMUNITY
Start: 2023-07-06

## 2023-07-11 NOTE — PATIENT INSTRUCTIONS
"It was a pleasure taking care of you today.  I've included a brief summary of our discussion and care plan from today's visit below.  Please review this information with your primary care provider.  ______________________________________________________________________    My recommendations are summarized as follows:    As we discussed today, you are going to have gastric emptying study and CT scan of abdomen for further evaluation of your GI symptoms.     2. Start a fiber supplement with a low dose (1/3 of daily recommended) and gradually taper the dose up.  See more information on fiber below.    3. Try a low FODMAP diet for a few weeks.     4. Use Anusol or Preparation H for hemorrhoids.    Return to GI Clinic in one month to review your progress.    ______________________________________________________________________    Irritable bowel syndrome (IBS)   Irritable bowel syndrome (IBS) is a chronic condition of the digestive system. Its primary symptoms are abdominal pain and changes in bowel habits (eg, constipation and/or diarrhea).  There are a number of theories about how and why irritable bowel syndrome (IBS) develops. Despite intensive research, the cause is not clear.   -One theory suggests that IBS is caused by abnormal contractions of the colon and intestines (hence the term \"spastic bowel,\" which has sometimes been used to describe IBS).   -Some people develop IBS after a severe gastrointestinal infection (eg, Salmonella or Campylobacter, or viruses). However, it is not clear how the infection triggers IBS to develop, and most people with IBS do not have a history of these infections.  -People with IBS who seek medical help are more likely to suffer from anxiety and stress than those who do not seek help. Stress and anxiety are known to affect the intestine; thus, it is likely that anxiety and stress worsen symptoms.    -Food intolerances are common in patients with IBS, raising the possibility that it " "is caused by food sensitivity or allergy. This theory has been difficult to prove, although it continues to be studied.   A number of foods are known to cause symptoms that mimic or aggravate IBS, including dairy products (which contain lactose), legumes (such as beans), and cruciferous vegetables (such as broccoli, cauliflower, [place] sprouts, and cabbage). These foods increase intestinal gas, which can cause cramps.    -Many researchers believe that IBS is caused by heightened sensitivity of the intestines. The medical term for this is \"visceral hyperalgesia.\" This theory proposes that nerves in the bowels are overactive in people with IBS, so that normal amounts of gas or movement are perceived as excessive and painful.     A person with irritable bowel syndrome may have frequent loose stools. Bowel movements usually occur during the daytime, and most often in the morning or after meals. Diarrhea is often preceded by a sense of extreme urgency and followed by a feeling of incomplete emptying. About one-half of people with IBS also notice mucous discharge with diarrhea. Diarrhea occurring during the night is very unusual with IBS.     Treatments are often given to reduce the pain and other symptoms of IBS, and it may be necessary to try more than one combination of treatments to find the one that is most helpful for you.  Diet:  The first step in treating IBS is usually to monitor your symptoms, daily bowel habits, and any other factors that may affect your bowels. This can help to identify factors that worsen symptoms in some people with IBS, such as lactose or other food intolerances and stress. Keeping a daily diary to track your diet and bowel symptoms can be helpful.  Many clinicians recommend temporarily eliminating milk products, since lactose intolerance is common and can aggravate IBS or cause symptoms similar to IBS. The greatest concentration of lactose is found in milk and ice cream, although it is " present in smaller quantities in yogurt, cottage and other cheeses.Try eliminating dairy for 2 weeks. If IBS symptoms improve, it is reasonable to continue avoiding lactose.   Many foods are only partially digested in the small intestines. When they reach the colon (large intestine), further digestion takes place, which may cause gas and cramps. Eliminating these foods temporarily is reasonable if gas or bloating is bothersome.  The most common gas-producing foods are legumes (such as beans) and cruciferous vegetables (such as cabbage, [place] sprouts, cauliflower, and broccoli). In addition, some people have trouble with onions, celery, carrots, raisins, bananas, apricots, prunes, sprouts, and wheat.    A bulk-forming fiber supplement, such as Psyllium, may also be recommended to increase fiber intake since it is difficult to consume enough fiber in the diet. Fiber supplements should be started at a low dose and increased slowly over several weeks to reduce the symptoms of excessive intestinal gas, which can occur in some people when beginning fiber therapy.     Some foods are easy to digest for people with IBS related diarrhea. These include the following: plain pasta or noodles, white rice. Boiled or baked potato. Whole white bread, Divehi bread, plain fish, plain chicken or turkey, soft-boiled eggs, rice or soy milk, cooked carrots,and cereals (plain Cornflakes, Rice Krispies, Corn or Rice Chex, or Cheerios).    Other approaches to management of IBS:  Stress and anxiety can worsen IBS in some people. The best approach for reducing stress and anxiety depends upon your situation and the severity of your symptoms.  Although many drugs are available to treat the symptoms of IBS, these drugs do not cure the condition. They are mainly used to relieve symptoms.      A high-fiber diet is a commonly recommended treatment for digestive problems, such as constipation, diarrhea, and hemorrhoids.   Most dietary fiber is not  digested or absorbed, so it stays within the intestine where it modulates digestion of other foods and affects the consistency of stool. There are two types of fiber, each of which is thought to have its own benefits:  ?Soluble fiber consists of a group of substances that is made of carbohydrates and dissolves in water. Examples of foods that contain soluble fiber include fruits, oats, barley, and legumes (peas and beans).  ?Insoluble fiber comes from plant cell walls and does not dissolve in water. Examples of foods that contain insoluble fiber include wheat, rye, and other grains. Insoluble fiber (wheat bran, and some fruits and vegetables) has been recommended to treat digestive problems such as constipation, hemorrhoids, chronic diarrhea, and fecal incontinence.   ?Dietary fiber is the sum of all soluble and insoluble fiber.Fiber bulks the stool, making it softer and easier to pass. Fiber helps the stool pass regularly, although it is not a laxative.   - Recommended daily dose of fiber is 25-35 gram. It is difficult to consume this amount of fiber from food alone. Therefore, I would suggest to take fiber supplement.  - Please start supplementation with a powdered soluble fiber. When used on a daily basis, this can help regulate the consistency of your stools.   - Metamucil (psyllium) and Citrucel are preferred examples. You can start with 1-2 teaspoons per day, with goal to gradually increase the dose  to 1 tablespoon daily. You can increase up to 1 tablespoon three times daily if needed.   - It is important to stay well-hydrated with use of fiber supplementation and to make sure that the fiber powder is well mixed with water as directed on the label.   - You may experience some bloating with initiation of fiber, which will improve over the first few weeks. We will evaluate the effect  of fiber in 3-6 months.   - Of note, many of the fiber products contain artificial sweeteners, which can cause bloating, gas,  and diarrhea in those who may be sensitive to artificial sweeteners. If this is the case, I would recommend trying Metamucil Premium Blend (with Stevia), Metamucil 4-in-1 without Added Sweeteners, or [place] (sweetened with [place] fruit extract).        FODMAP:  The FODMAP term was coined by [place] researchers Hilda Amaya and Jacky Wright. They found that a low-FODMAP diet helped 75-85% of patients with irritable bowel syndrome or IBS.  Products containing lactose (dairy), fructose (fruit sugar),sweeteners (sorbitol, mannitol), fructans (a type of fiber found in wheat, onions, garlic and chicory root), and GOS (a type of fiber found in beans, hummus and soy milk) are examples of FODMAPs.  They can be poorly absorbed during the digestive process. They are rapidly fermented by the bacteria that live in your gut. They are capable of pulling fluid into the gut in a process called osmosis. The increased fluid load, along with the type and amount of gas produced, cause distension and motility changes, leading to bloating, abdominal pain, diarrhea, and nausea. Symptoms are often delayed until hours after eating a high FODMAP meal or snack, because it takes time for FODMAPs to make their way through the stomach and into the intestines, where the effects occur. By reducing the overall dietary load of these carbohydrates, troublesome GI symptoms can be minimized or eliminated.     A low-FODMAP diet avoids foods containing certain sugars and certain fibers capable of causing diarrhea, constipation, gas, bloating and abdominal pain in people with IBS. At the same time, I would recommend not to exclude all of the FODMAPs foods completely from your diet, but instead, to use alternative foods from the same group. Try the diet for 4-6 weeks, if you have not noticed significant changes in your symptoms, stop the diet. Discuss further plan with your primary care provider or gastroenterology provider.        ______________________________________________________________________    Who do I call with any questions after my visit?  Please be in touch if there are any further questions that arise following today's visit.  There are multiple ways to contact your gastroenterology care team.      During business hours, you may reach a Gastroenterology nurse at [phone], option 3.     To schedule or reschedule an appointment, please call [phone].   To schedule your imaging studies (CT, MRI, ultrasound)  call [phone] (or toll-free # [phone])  To schedule your lab work at [facility], please call [phone]    You can always send a secure message through Superbly.  Superbly messages are answered by your nurse or doctor typically within 24 hours.  Please allow extra time on weekends and holidays.      For urgent/emergent questions after business hours, you may reach the on-call GI Fellow by contacting the [facility]  at [phone].    In order for your refill to be processed in a timely fashion, it is your responsibility to ensure you follow the recommendations from your provider regarding your laboratory studies and follow up appointments.       How will I get the results of any tests ordered?    You will receive all of your results.  If you have signed up for [employer], any tests ordered at your visit will be available to you after your physician reviews them.  Typically this takes 1-2 weeks.  If there are urgent results that require a change in your care plan, your physician or nurse will call you to discuss the next steps.   What is Superbly?  Superbly is a secure way for you to access all of your healthcare records from the [facility].  It is a web based computer program, so you can sign on to it from any location.  It also allows you to send secure messages to your care team.  I recommend signing up for Superbly access if you have not already done so  and are comfortable with using a computer.    How to I schedule a follow-up visit?  If you did not schedule a follow-up visit today, please call [phone] to schedule a follow-up office visit.      Sincerely,  CHINO Arteaga,  [facility],  Division of Gastroenterology   ([facility])

## 2023-07-19 ENCOUNTER — HOSPITAL ENCOUNTER (OUTPATIENT)
Dept: NUCLEAR MEDICINE | Facility: CLINIC | Age: 38
Setting detail: NUCLEAR MEDICINE
Discharge: HOME | End: 2023-07-19
Attending: NURSE PRACTITIONER
Payer: COMMERCIAL

## 2023-07-19 ENCOUNTER — HOSPITAL ENCOUNTER (OUTPATIENT)
Dept: CT IMAGING | Facility: CLINIC | Age: 38
Discharge: HOME | End: 2023-07-19
Attending: NURSE PRACTITIONER
Payer: COMMERCIAL

## 2023-07-19 DIAGNOSIS — K58.2 IRRITABLE BOWEL SYNDROME WITH BOTH CONSTIPATION AND DIARRHEA: ICD-10-CM

## 2023-07-19 DIAGNOSIS — R14.0 ABDOMINAL BLOATING: ICD-10-CM

## 2023-07-19 DIAGNOSIS — R10.13 DYSPEPSIA: ICD-10-CM

## 2023-07-19 PROCEDURE — 343N000001 HC RX 343: Performed by: NURSE PRACTITIONER

## 2023-07-19 PROCEDURE — 74177 CT ABD & PELVIS W/CONTRAST: CPT

## 2023-07-19 PROCEDURE — A9541 TC99M SULFUR COLLOID: HCPCS | Performed by: NURSE PRACTITIONER

## 2023-07-19 PROCEDURE — 74177 CT ABD & PELVIS W/CONTRAST: CPT | Mod: 26 | Performed by: STUDENT IN AN ORGANIZED HEALTH CARE EDUCATION/TRAINING PROGRAM

## 2023-07-19 PROCEDURE — 250N000011 HC RX IP 250 OP 636: Performed by: NURSE PRACTITIONER

## 2023-07-19 PROCEDURE — 78264 GASTRIC EMPTYING IMG STUDY: CPT

## 2023-07-19 PROCEDURE — 78264 GASTRIC EMPTYING IMG STUDY: CPT | Mod: 26 | Performed by: RADIOLOGY

## 2023-07-19 RX ORDER — IOPAMIDOL 755 MG/ML
80 INJECTION, SOLUTION INTRAVASCULAR ONCE
Status: COMPLETED | OUTPATIENT
Start: 2023-07-19 | End: 2023-07-19

## 2023-07-19 RX ADMIN — IOPAMIDOL 80 ML: 755 INJECTION, SOLUTION INTRAVENOUS at 11:47

## 2023-07-19 RX ADMIN — Medication 2.2 MILLICURIE: at 08:00

## 2023-08-17 ENCOUNTER — MYC MEDICAL ADVICE (OUTPATIENT)
Dept: OBGYN | Facility: CLINIC | Age: 38
End: 2023-08-17
Payer: COMMERCIAL

## 2023-08-17 NOTE — TELEPHONE ENCOUNTER
Panel Management Review    Date of last visit with a [facility] provider: Kasia Tan MD  on 3/29/2023.  Date of next visit with a [facility] provider: None.    Health Maintenance List    Health Maintenance   Topic Date Due    A1C  Never done    BMP  Never done    LIPID  Never done    MICROALBUMIN  Never done    DIABETIC FOOT EXAM  Never done    ADVANCE CARE PLANNING  Never done    EYE EXAM  Never done    Pneumococcal Vaccine: Pediatrics (0 to 5 Years) and At-Risk Patients (6 to 64 Years) (1 - PCV) Never done    HIV SCREENING  Never done    HEPATITIS C SCREENING  Never done    YEARLY PREVENTIVE VISIT  05/14/2022    HPV TEST  02/14/2023    PAP  02/14/2023    HEPATITIS B IMMUNIZATION (2 of 3 - 19+ 3-dose series) 07/28/2023    INFLUENZA VACCINE (1) 09/01/2023    DTAP/TDAP/TD IMMUNIZATION (10 - Td or Tdap) 11/25/2024    PHQ-2 (once per calendar year)  Completed    IPV IMMUNIZATION  Completed    COVID-19 Vaccine  Completed    MENINGITIS IMMUNIZATION  Aged Out       Composite cancer screening  Chart review shows that this patient is due/due soon for the following Pap Smear  No results found for: PAP  Past Surgical History:   Procedure Laterality Date    GYN SURGERY  2018    laparoscopy for endometriosis    HEAD & NECK SURGERY  age 15    tooth extraction    LAPAROSCOPIC MYOMECTOMY UTERUS N/A 11/9/2021    Procedure: Laparoscopy lysis of adhesions;  Surgeon: Boyd Bowles MD;  Location:  OR    LASER CO2 LAPAROSCOPIC VAPORIZATION ENDOMETRIUM N/A 4/30/2019    Procedure: LAPAROSCOPY WITH CO2 LASER;  Surgeon: Boyd Bowles MD;  Location:  OR    LASER CO2 LAPAROSCOPY DIAGNOSTIC, LYSIS ADHESIONS, COMBINED N/A 11/9/2021    Procedure: with Co2 Laser;  Surgeon: Boyd Bowles MD;  Location:  OR       Is hysterectomy listed in surgical history? No   Is mastectomy listed in surgical history? No     Summary:    Patient is due/failing the following:   Pap Smear    Action needed: Patient needs office  visit for Pap.    Type of outreach:  Sent [employer] message.      Staff Signature:  Gregoria Marinelli [facility] 8/17/2023  at [phone]

## 2023-09-29 NOTE — PROGRESS NOTES
"GASTROENTEROLOGY RETURN PATIENT VIRTUAL VISIT      How would you like to obtain your AVS? MyChart  If the video visit is dropped, the invitation should be resent by: Text to cell phone: [phone]  Will anyone else be joining your video visit? No  ..    Video-Visit Details    Type of service:  Video Visit    Video Start Time (time video started): 1335    Video End Time (time video stopped): 1348    Originating Location (pt. Location): Home      Distant Location (provider location):  On-site    Mode of Communication:  Video Conference via [employer]    Physician has received verbal consent for a Video Visit from the patient? Yes      GASTROENTEROLOGY RETURN PATIENT VIDEO VISIT    CC/REFERRING MD:    Jhonny Ochoa    REASON FOR CONSULTATION:    Follow Up (Rectal bleeding /Iritable bowel syndrome with diarrhea /)      HISTORY OF PRESENT ILLNESS:  Paola Couch is 38 year old female who presents for a follow up on changes in bowel habits and rectal bleeding. Hx of IBS-D with associated abdominal bloating and pain. Lately, has been having 5-6 stools a day, ranging from liquid to soft in consistency stools. I recommended to start a fiber supplement.  Patient said that she bought Benefiber but did not have a chance to start it yet.    Ongoing intermittent abdominal pain, history of endometriosis. Stated that in the past, after her first surgery for endometriosis, her abdominal pain had improved. Not certain if the pain is related to gynecologic or GI issues, \"I guess, both\".   Hx of diabetes with last A1c of 10.3%.  Was started on short and long-acting insulin.  Patient reported getting her blood sugars under control.  Her bloating had improved.  Reported resolution of postprandial fullness and epigastric discomfort.  She still has occasional nausea, but no emesis.  She relates her nausea to infertility treatment. She is currently on Lovenox injections twice a day.  Stated that she has been noticing intermittent " rectal bleeding. Reported small amount of red blood on tissues after defecation.  Patient has known history of hemorrhoids.  Normal colonoscopy otherwise in 2019.    PREVIOUS ENDOSCOPY:  2019 Colonoscopy  Hemorrhoids. Otherwise, normal study.    RADIOLOGY:    FINDINGS:   Percent retention at 60 min = 65%.  Percent retention at 120 min = 30%.  Percent retention at 180 min = 10%.  Percent retention at 240 min = images were not obtained at 4 hours as  the patient had already reached the 10% threshold at 3 hours.     T 1/2 emptying time =  85 mins.                                                                 IMPRESSION: Normal  gastric emptying (see normal ranges below).    HISTORY:   has a past medical history of Anxiety, Complication of anesthesia, COVID-19, Diabetes (H), Endometriosis, HPV in female (2014), and IBS (irritable bowel syndrome).     has a past surgical history that includes Head and neck surgery (age 15); Laser CO2 laparoscopic vaporization endometrium (N/A, 4/30/2019); GYN surgery (2018); Laparoscopic myomectomy uterus (N/A, 11/9/2021); and Laser CO2 laparoscopy diagnostic, lysis adhesions, combined (N/A, 11/9/2021).     reports that she has quit smoking. She has never used smokeless tobacco. She reports current alcohol use. She reports that she does not use drugs.    family history includes Diabetes in her maternal grandfather and maternal grandmother.    ALLERGIES:   No Known Allergies    PERTINENT MEDICATIONS:    Current Outpatient Medications:     atomoxetine (STRATTERA) 40 MG capsule, , Disp: , Rfl:     blood glucose ([employer] BLOOD GLUCOSE TEST) test strip, , Disp: , Rfl:     blood glucose monitoring (FREESTYLE) lancets, , Disp: , Rfl:     cholecalciferol (VITAMIN D3) 5000 units TABS tablet, Take 5,000 Units by mouth daily, Disp: , Rfl:     Coenzyme Q10 (COQ10 PO), Take 100 mg by mouth daily , Disp: , Rfl:     escitalopram (LEXAPRO) 5 MG tablet, Take 10 mg by mouth every morning , Disp: , Rfl:      FREESTYLE LITE test strip, , Disp: , Rfl:     magnesium 200 MG TABS, Take 200 mg by mouth, Disp: , Rfl:     metFORMIN (GLUCOPHAGE XR) 500 MG 24 hr tablet, Take 1,000 mg by mouth 2 times daily (with meals), Disp: , Rfl:     naltrexone 1.5 MG capsule, Take 3 mg by mouth At Bedtime, Disp: , Rfl:     norethindrone-ethinyl estradiol (JUNEL FE 1/20) 1-20 MG-MCG tablet, TAKE 1 ACTIVE TABLET BY MOUTH DAILY FOR CONTINUOUS SUPPRESSION, Disp: 112 tablet, Rfl: 0    Omega-3 Fatty Acids (FISH OIL PO), Take 900 mg by mouth daily , Disp: , Rfl:     Prenatal Vit-Fe Fumarate-FA (PRENATAL VITAMIN PO), Take 1 tablet by mouth daily [place], Disp: , Rfl:     UNABLE TO FIND, MEDICATION NAME: alpha lopic acid, one tablet daily, Disp: , Rfl:       ROS:     No fevers or chills  No weight loss  No blurry vision, double vision or change in vision  No sore throat  No lymphadenopathy  No headache, paraesthesias, or weakness in a limb  No shortness of breath or wheezing  No chest pain or pressure  No arthralgias or myalgias  No rashes or skin changes  No odynophagia or dysphagia  No  hematochezia, melena  No dysuria, frequency or urgency  No hot/cold intolerance or polyria  No anxiety or depression    PHYSICAL EXAMINATION:  Wt:   Wt Readings from Last 2 Encounters:   03/21/23 107.3 kg (236 lb 9.6 oz)   12/08/21 109 kg (240 lb 6.4 oz)      Physical Exam  Vitals reviewed: There were no vitals taken for this visit.   Constitutional: aaox3, cooperative, pleasant, not dyspneic/diaphoretic, no acute distress  Eyes: Sclera anicteric/injected  Respiratory: Unlabored breathing, speaking in full sentences.   Psych: Normal affect, speech is clear and appropriate.Neatly groomed    ASSESSMENT/PLAN:    ICD-10-CM    1. Irritable bowel syndrome with both constipation and diarrhea  K58.2 Adult GI Clinic Follow-Up Order (Blank)      2. Abdominal bloating  R14.0       3. Hemorrhoids, unspecified hemorrhoid type  K64.9          Paola Couch is a 38  year old female who presents for a follow up on irritable bowel syndrome, diarrhea predominant.  At her last visit, patient complained of postprandial abdominal bloating and pain. Ongoing issue for many years.  She reported symptoms of early satiety and sensation that food is not getting digested.  Was referred to gastric emptying study, which came back within normal range. CT scan of abdomen was suggestive for hepatic steatosis, but no other acute or chronic abnormalities.  We discussed multifactorial etiology of her symptoms and mechanisms involved in functional bowel disorder. Talked about chronicity of IBS and common symptoms. Briefly discussed benefits of treatment with fiber supplement or cholestyramine. Mentioned other medications available for control of symptoms. At this time, patient is on infertility treatment and therefore, there is a potential drug-drug interaction with cholestyramine. Decided not to take any medications for IBS. Suggested to start Benefiber.  Last A1c was 10.3%.  She was started on short and long-acting insulin by her primary care provider.  Reported getting her blood sugars under control.  Stated that she noticed some improvement in her GI symptoms such as resolution of postprandial epigastric fullness and improvement in abdominal bloating. Reported intermittent nausea, likely related to infertility Tx. Suggested to continue taking antiemetics as needed. No new GI symptoms. Will not pursue any further evaluation at this time unless rectal bleeding becomes worse.  Suggested to follow up with gynecology.     Follow up as needed  This note was created with Dragon voice recognition software. Inadvertent minor typographic errors may occur in transcription. Feel free to contact the provider if you have any questions.  I sincerely appreciate an opportunity to provide consultation for this pleasant patient.    CHINO Arteaga  [facility],  Gastroenterology,  [place], [place]

## 2023-10-02 ENCOUNTER — VIRTUAL VISIT (OUTPATIENT)
Dept: GASTROENTEROLOGY | Facility: CLINIC | Age: 38
End: 2023-10-02
Attending: NURSE PRACTITIONER
Payer: COMMERCIAL

## 2023-10-02 DIAGNOSIS — K58.2 IRRITABLE BOWEL SYNDROME WITH BOTH CONSTIPATION AND DIARRHEA: Primary | ICD-10-CM

## 2023-10-02 DIAGNOSIS — R14.0 ABDOMINAL BLOATING: ICD-10-CM

## 2023-10-02 DIAGNOSIS — K64.9 HEMORRHOIDS, UNSPECIFIED HEMORRHOID TYPE: ICD-10-CM

## 2023-10-02 PROCEDURE — 99214 OFFICE O/P EST MOD 30 MIN: CPT | Mod: VID | Performed by: NURSE PRACTITIONER

## 2023-10-02 RX ORDER — FILGRASTIM-AAFI 300 UG/ML
INJECTION, SOLUTION INTRAVENOUS; SUBCUTANEOUS DAILY
COMMUNITY
Start: 2023-09-20

## 2023-10-02 RX ORDER — INSULIN LISPRO-AABC 100 [IU]/ML
INJECTION, SOLUTION SUBCUTANEOUS
COMMUNITY
Start: 2023-07-07

## 2023-10-02 RX ORDER — CYANOCOBALAMIN (VITAMIN B-12) 500 MCG
400 LOZENGE ORAL DAILY
COMMUNITY

## 2023-10-02 RX ORDER — DIPHENHYDRAMINE HCL 25 MG
25 TABLET ORAL AT BEDTIME
COMMUNITY

## 2023-10-02 RX ORDER — ESTRADIOL 2 MG/1
2 TABLET ORAL 3 TIMES DAILY
COMMUNITY
Start: 2023-09-24

## 2023-10-02 RX ORDER — ESCITALOPRAM OXALATE 10 MG/1
10 TABLET ORAL DAILY
COMMUNITY
Start: 2023-08-17

## 2023-10-02 RX ORDER — TACROLIMUS 1 MG/1
1 CAPSULE ORAL 2 TIMES DAILY
COMMUNITY
Start: 2023-09-28

## 2023-10-02 RX ORDER — LORATADINE 10 MG/1
10 TABLET ORAL DAILY
COMMUNITY

## 2023-10-02 RX ORDER — FAMOTIDINE 20 MG/1
20 TABLET, FILM COATED ORAL DAILY
COMMUNITY

## 2023-10-02 RX ORDER — PROGESTERONE 200 MG/1
200 CAPSULE ORAL 3 TIMES DAILY
COMMUNITY
Start: 2023-09-06

## 2023-10-02 RX ORDER — ENOXAPARIN SODIUM 100 MG/ML
30 INJECTION SUBCUTANEOUS 2 TIMES DAILY
COMMUNITY

## 2023-10-02 ASSESSMENT — PATIENT HEALTH QUESTIONNAIRE - PHQ9: SUM OF ALL RESPONSES TO PHQ QUESTIONS 1-9: 19

## 2023-10-02 NOTE — PATIENT INSTRUCTIONS
"It was a pleasure taking care of you today.  I've included a brief summary of our discussion and care plan from today's visit below.  Please review this information with your primary care provider.  ______________________________________________________________________    My recommendations are summarized as follows:    As we discussed today, your CT scan came back unremarkable with no acute findings. Noted presence of fatty liver which could be due to obesity, diabetes, high cholesterol, or metabolic disease.    2. You do not have signs of gastroparesis or delayed stomach emptying.    3. Please start small dose of fiber supplement and gradually taper the dose up to a recommended daily dose.    4.  Take famotidine for acid reflux.    Return to GI Clinic as needed   ______________________________________________________________________  Irritable bowel syndrome (IBS)   Irritable bowel syndrome (IBS) is a chronic condition of the digestive system. Its primary symptoms are abdominal pain and changes in bowel habits (eg, constipation and/or diarrhea).  There are a number of theories about how and why irritable bowel syndrome (IBS) develops. Despite intensive research, the cause is not clear.   -One theory suggests that IBS is caused by abnormal contractions of the colon and intestines (hence the term \"spastic bowel,\" which has sometimes been used to describe IBS).   -Some people develop IBS after a severe gastrointestinal infection (eg, Salmonella or Campylobacter, or viruses). However, it is not clear how the infection triggers IBS to develop, and most people with IBS do not have a history of these infections.  -People with IBS who seek medical help are more likely to suffer from anxiety and stress than those who do not seek help. Stress and anxiety are known to affect the intestine; thus, it is likely that anxiety and stress worsen symptoms.    -Food intolerances are common in patients with IBS, raising the " "possibility that it is caused by food sensitivity or allergy. This theory has been difficult to prove, although it continues to be studied.   A number of foods are known to cause symptoms that mimic or aggravate IBS, including dairy products (which contain lactose), legumes (such as beans), and cruciferous vegetables (such as broccoli, cauliflower, [place] sprouts, and cabbage). These foods increase intestinal gas, which can cause cramps.    -Many researchers believe that IBS is caused by heightened sensitivity of the intestines. The medical term for this is \"visceral hyperalgesia.\" This theory proposes that nerves in the bowels are overactive in people with IBS, so that normal amounts of gas or movement are perceived as excessive and painful.     A person with irritable bowel syndrome may have frequent loose stools. Bowel movements usually occur during the daytime, and most often in the morning or after meals. Diarrhea is often preceded by a sense of extreme urgency and followed by a feeling of incomplete emptying. About one-half of people with IBS also notice mucous discharge with diarrhea. Diarrhea occurring during the night is very unusual with IBS.     Treatments are often given to reduce the pain and other symptoms of IBS, and it may be necessary to try more than one combination of treatments to find the one that is most helpful for you.  Diet:  The first step in treating IBS is usually to monitor your symptoms, daily bowel habits, and any other factors that may affect your bowels. This can help to identify factors that worsen symptoms in some people with IBS, such as lactose or other food intolerances and stress. Keeping a daily diary to track your diet and bowel symptoms can be helpful.  Many clinicians recommend temporarily eliminating milk products, since lactose intolerance is common and can aggravate IBS or cause symptoms similar to IBS. The greatest concentration of lactose is found in milk and ice " cream, although it is present in smaller quantities in yogurt, cottage and other cheeses.Try eliminating dairy for 2 weeks. If IBS symptoms improve, it is reasonable to continue avoiding lactose.   Many foods are only partially digested in the small intestines. When they reach the colon (large intestine), further digestion takes place, which may cause gas and cramps. Eliminating these foods temporarily is reasonable if gas or bloating is bothersome.  The most common gas-producing foods are legumes (such as beans) and cruciferous vegetables (such as cabbage, Distant sprouts, cauliflower, and broccoli). In addition, some people have trouble with onions, celery, carrots, raisins, bananas, apricots, prunes, sprouts, and wheat.    A bulk-forming fiber supplement, such as Psyllium, may also be recommended to increase fiber intake since it is difficult to consume enough fiber in the diet. Fiber supplements should be started at a low dose and increased slowly over several weeks to reduce the symptoms of excessive intestinal gas, which can occur in some people when beginning fiber therapy.     Some foods are easy to digest for people with IBS related diarrhea. These include the following: plain pasta or noodles, white rice. Boiled or baked potato. Whole white bread, [place] bread, plain fish, plain chicken or turkey, soft-boiled eggs, rice or soy milk, cooked carrots,and cereals (plain Cornflakes, Rice Krispies, Corn or Rice Chex, or Cheerios).    Other approaches to management of IBS:  Stress and anxiety can worsen IBS in some people. The best approach for reducing stress and anxiety depends upon your situation and the severity of your symptoms.  Although many drugs are available to treat the symptoms of IBS, these drugs do not cure the condition. They are mainly used to relieve symptoms.        A high-fiber diet is a commonly recommended treatment for digestive problems, such as constipation, diarrhea, and hemorrhoids.    Most dietary fiber is not digested or absorbed, so it stays within the intestine where it modulates digestion of other foods and affects the consistency of stool. There are two types of fiber, each of which is thought to have its own benefits:  ?Soluble fiber consists of a group of substances that is made of carbohydrates and dissolves in water. Examples of foods that contain soluble fiber include fruits, oats, barley, and legumes (peas and beans).  ?Insoluble fiber comes from plant cell walls and does not dissolve in water. Examples of foods that contain insoluble fiber include wheat, rye, and other grains. Insoluble fiber (wheat bran, and some fruits and vegetables) has been recommended to treat digestive problems such as constipation, hemorrhoids, chronic diarrhea, and fecal incontinence.   ?Dietary fiber is the sum of all soluble and insoluble fiber.Fiber bulks the stool, making it softer and easier to pass. Fiber helps the stool pass regularly, although it is not a laxative.   - Recommended daily dose of fiber is 25-35 gram. It is difficult to consume this amount of fiber from food alone. Therefore, I would suggest to take fiber supplement.  - Please start supplementation with a powdered soluble fiber. When used on a daily basis, this can help regulate the consistency of your stools.   - Metamucil (psyllium) and Citrucel are preferred examples. You can start with 1-2 teaspoons per day, with goal to gradually increase the dose  to 1 tablespoon daily. You can increase up to 1 tablespoon three times daily if needed.   - It is important to stay well-hydrated with use of fiber supplementation and to make sure that the fiber powder is well mixed with water as directed on the label.   - You may experience some bloating with initiation of fiber, which will improve over the first few weeks. We will evaluate the effect  of fiber in 3-6 months.   - Of note, many of the fiber products contain artificial sweeteners, which  can cause bloating, gas, and diarrhea in those who may be sensitive to artificial sweeteners. If this is the case, I would recommend trying Metamucil Premium Blend (with Stevia), Metamucil 4-in-1 without Added Sweeteners, or [place] (sweetened with [place]k fruit extract).        ______________________________________________________________________    Who do I call with any questions after my visit?  Please be in touch if there are any further questions that arise following today's visit.  There are multiple ways to contact your gastroenterology care team.      During business hours, you may reach a Gastroenterology nurse at [phone], option 3.     To schedule or reschedule an appointment, please call [phone].   To schedule your imaging studies (CT, MRI, ultrasound)  call [phone] (or toll-free # [phone])  To schedule your lab work at [facility], please call [phone]    You can always send a secure message through [employer].  [employer] messages are answered by your nurse or doctor typically within 24 hours.  Please allow extra time on weekends and holidays.      For urgent/emergent questions after business hours, you may reach the on-call GI Fellow by contacting the [facility]  at [phone].    In order for your refill to be processed in a timely fashion, it is your responsibility to ensure you follow the recommendations from your provider regarding your laboratory studies and follow up appointments.       How will I get the results of any tests ordered?    You will receive all of your results.  If you have signed up for [employer], any tests ordered at your visit will be available to you after your physician reviews them.  Typically this takes 1-2 weeks.  If there are urgent results that require a change in your care plan, your physician or nurse will call you to discuss the next steps.   What is [employer]?  [employer] is a secure way for you to access all of  your healthcare records from the [facility].  It is a web based computer program, so you can sign on to it from any location.  It also allows you to send secure messages to your care team.  I recommend signing up for [employer] access if you have not already done so and are comfortable with using a computer.    How to I schedule a follow-up visit?  If you did not schedule a follow-up visit today, please call [phone] to schedule a follow-up office visit.      Sincerely,  CHINO Arteaga,  [facility],  Division of Gastroenterology   ([facility])

## 2023-10-07 ENCOUNTER — HEALTH MAINTENANCE LETTER (OUTPATIENT)
Age: 38
End: 2023-10-07

## 2024-02-24 ENCOUNTER — HEALTH MAINTENANCE LETTER (OUTPATIENT)
Age: 39
End: 2024-02-24

## 2024-06-24 NOTE — PROGRESS NOTES
SUBJECTIVE:                                                   Paola Couch is a 39 year old female who presents to clinic today for the following health issue(s):  Patient presents with:  Repeat Pap Smear  Breast and pelvic exam      HPI:  Doing okay  Has tried for egg retrievals and IVF with no success.  Does feel like her endometriosis symptoms are worsening.  Wondering about another surgery for endometriosis.  Has previously seen Dr. Bowles. He recommended robotic surgeon if future surgeries needed.    Patient's last menstrual period was 2024 (exact date)..     Patient is sexually active, .  Using oral contraceptives for contraception.   STD testing offered?  Declined   reports that she has quit smoking. She has never used smokeless tobacco.        Health maintenance updated:  yes    Today's PHQ-2 Score:       10/2/2023     1:13 PM   PHQ-2 (  [employer])   Q1: Little interest or pleasure in doing things 2   Q2: Feeling down, depressed or hopeless 2   PHQ-2 Score 4     Today's PHQ-9 Score:       10/2/2023     1:13 PM   PHQ-9 SCORE   PHQ-9 Total Score 19     Today's RAUDEL-7 Score:       2019     2:33 PM   RAUDEL-7 SCORE   Total Score 9       Problem list and histories reviewed & adjusted, as indicated.  Additional history: as documented.    Patient Active Problem List   Diagnosis    Morbid obesity (H)    Controlled type 2 diabetes mellitus without complication, without long-term current use of insulin (H)    Irritable bowel syndrome with diarrhea    Generalized anxiety disorder    Attention deficit hyperactivity disorder (ADHD)    Endometriosis    Hemorrhoids, unspecified hemorrhoid type     Past Surgical History:   Procedure Laterality Date    GYN SURGERY  2018    laparoscopy for endometriosis    HEAD & NECK SURGERY  age 15    tooth extraction    LAPAROSCOPIC MYOMECTOMY UTERUS N/A 2021    Procedure: Laparoscopy lysis of adhesions;  Surgeon: Boyd Bowles MD;  Location:  OR    LASER CO2  LAPAROSCOPIC VAPORIZATION ENDOMETRIUM N/A 4/30/2019    Procedure: LAPAROSCOPY WITH CO2 LASER;  Surgeon: Boyd Bowles MD;  Location:  OR    LASER CO2 LAPAROSCOPY DIAGNOSTIC, LYSIS ADHESIONS, COMBINED N/A 11/9/2021    Procedure: with Co2 Laser;  Surgeon: Boyd Bowles MD;  Location:  OR      Social History     Tobacco Use    Smoking status: Former    Smokeless tobacco: Never   Substance Use Topics    Alcohol use: Yes     Comment: occasionally      Problem (# of Occurrences) Relation (Name,Age of Onset)    Diabetes (2) Maternal Grandmother, Maternal Grandfather              Current Outpatient Medications   Medication Sig Dispense Refill    amphetamine-dextroamphetamine (ADDERALL XR) 15 MG 24 hr capsule Take 15 mg by mouth      atomoxetine (STRATTERA) 40 MG capsule       blood glucose ([employer] BLOOD GLUCOSE TEST) test strip       blood glucose monitoring (FREESTYLE) lancets       cholecalciferol (VITAMIN D3) 5000 units TABS tablet Take 5,000 Units by mouth daily      Coenzyme Q10 (COQ10 PO) Take 100 mg by mouth daily       diphenhydrAMINE (BENADRYL) 25 MG tablet Take 25 mg by mouth At Bedtime      escitalopram (LEXAPRO) 10 MG tablet Take 10 mg by mouth daily      estradiol (ESTRACE) 2 MG tablet Place 2 mg vaginally 3 times daily prn      FREESTYLE LITE test strip       insulin glargine U-300 (TOUJEO) 300 UNIT/ML (1 units dial) pen Inject 16 Units Subcutaneous At Bedtime      loratadine (CLARITIN) 10 MG tablet Take 10 mg by mouth daily      LYUMJEV KWIKPEN 100 UNIT/ML SOPN Inject 3 times per day 5-10 minutes before a meal per sliding scale given; max daily dose 30 units.      magnesium 200 MG TABS Take 200 mg by mouth      metFORMIN (GLUCOPHAGE XR) 500 MG 24 hr tablet Take 2,000 mg by mouth 2 times daily (with meals)      naltrexone 1.5 MG capsule Take 3 mg by mouth At Bedtime      norethindrone-ethinyl estradiol (JUNEL FE 1/20) 1-20 MG-MCG tablet Take 1 tablet by mouth daily 90 tablet 3     "norethindrone-ethinyl estradiol (JUNEL FE 1/20) 1-20 MG-MCG tablet TAKE 1 ACTIVE TABLET BY MOUTH DAILY FOR CONTINUOUS SUPPRESSION 112 tablet 0    Omega-3 Fatty Acids (FISH OIL PO) Take 900 mg by mouth daily       Prenatal Vit-Fe Fumarate-FA (PRENATAL VITAMIN PO) Take 1 tablet by mouth daily [place]      UNABLE TO FIND MEDICATION NAME: alpha lopic acid, one tablet daily      vitamin E 400 units TABS Take 400 Units by mouth daily      enoxaparin ANTICOAGULANT (LOVENOX) 30 MG/0.3ML syringe Inject 30 mg Subcutaneous 2 times daily (Patient not taking: Reported on 6/26/2024)      famotidine (PEPCID) 20 MG tablet Take 20 mg by mouth daily (Patient not taking: Reported on 6/26/2024)      NIVESTYM 300 MCG/ML injection Inject Subcutaneous daily (Patient not taking: Reported on 6/26/2024)      progesterone (PROMETRIUM) 200 MG capsule Place 200 mg vaginally 3 times daily (Patient not taking: Reported on 6/26/2024)      tacrolimus (GENERIC) 1 MG capsule Take 1 mg by mouth 2 times daily (Patient not taking: Reported on 6/26/2024)       No current facility-administered medications for this visit.     No Known Allergies      OBJECTIVE:     /70 (BP Location: Right arm, Patient Position: Sitting, Cuff Size: Adult Regular)   Ht 1.626 m (5' 4\")   Wt 105 kg (231 lb 6.4 oz)   LMP 06/22/2024 (Exact Date)   BMI 39.72 kg/m    Body mass index is 39.72 kg/m .    Exam:  Constitutional:  Appearance: Well nourished, well developed alert, in no acute distress  Chest:  Respiratory Effort:  Breathing unlabored. Clear to auscultation bilaterally.   Cardiovascular: Heart: Auscultation:  Regular rate, normal rhythm, no murmurs present  Breasts:  Inspection of Breasts:  Symmetric bilaterally.  No puckering.  No skin changes.  Palpation of Breasts and Axillae:  No masses present on palpation, no breast tenderness Axillary Lymph Nodes:  No lymphadenopathy present  Gastrointestinal:  Abdominal Examination:  Abdomen nontender to palpation, " tone normal without rigidity or guarding, no masses present, umbilicus without lesions; Liver/Spleen:  No hepatomegaly present, liver nontender to palpation; Hernias:  No hernias present  Psychiatric:  Mentation appears normal and affect normal/bright.  Pelvic Exam:  External Genitalia:     Normal appearance for age, no discharge present, no tenderness present, no inflammatory lesions present, color normal  Vagina:     Normal vaginal vault without central or paravaginal defects, no discharge present, no inflammatory lesions present, no masses present  Bladder:     Nontender to palpation  Urethra:   Urethral Body:  Urethra palpation normal, urethra structural support normal   Urethral Meatus:  No erythema or lesions present  Cervix:     Appearance healthy, no lesions present, nontender to palpation, scant dark brown blood present. Pap collected  Uterus:     Uterus: firm, normal sized and nontender, midplane in position.   Adnexa:     No adnexal tenderness present, no adnexal masses present  Perineum:     Perineum within normal limits, no evidence of trauma, no rashes or skin lesions present  Anus:     Anus within normal limits, no hemorrhoids present  Inguinal Lymph Nodes:     No lymphadenopathy present  Pubic Hair:     Normal pubic hair distribution for age  Genitalia and Groin:     No rashes present, no lesions present, no areas of discoloration, no masses present     In-Clinic Test Results:  No results found for this or any previous visit (from the past 24 hour(s)).    ASSESSMENT/PLAN:                                                        ICD-10-CM    1. Encounter for breast and pelvic examination  Z01.419       2. Screening for cervical cancer  Z12.4 Gynecologic Cytology (Pap) and HPV      3. Endometriosis  N80.9 norethindrone-ethinyl estradiol (JUNEL FE 1/20) 1-20 MG-MCG tablet          Normal exam today.  Refill Cely  Discussed options for physicians with robotic training that can help with endometriosis  treatment.  Follow-up in 1 year for annual or sooner prn.    Kasia Tan MD  [facility]

## 2024-06-26 ENCOUNTER — OFFICE VISIT (OUTPATIENT)
Dept: OBGYN | Facility: CLINIC | Age: 39
End: 2024-06-26
Payer: COMMERCIAL

## 2024-06-26 VITALS
BODY MASS INDEX: 39.5 KG/M2 | SYSTOLIC BLOOD PRESSURE: 118 MMHG | HEIGHT: 64 IN | DIASTOLIC BLOOD PRESSURE: 70 MMHG | WEIGHT: 231.4 LBS

## 2024-06-26 DIAGNOSIS — Z12.4 SCREENING FOR CERVICAL CANCER: ICD-10-CM

## 2024-06-26 DIAGNOSIS — Z01.419 ENCOUNTER FOR BREAST AND PELVIC EXAMINATION: Primary | ICD-10-CM

## 2024-06-26 DIAGNOSIS — N80.9 ENDOMETRIOSIS: ICD-10-CM

## 2024-06-26 PROCEDURE — 99214 OFFICE O/P EST MOD 30 MIN: CPT | Mod: 25 | Performed by: OBSTETRICS & GYNECOLOGY

## 2024-06-26 PROCEDURE — G0101 CA SCREEN;PELVIC/BREAST EXAM: HCPCS | Performed by: OBSTETRICS & GYNECOLOGY

## 2024-06-26 PROCEDURE — 87624 HPV HI-RISK TYP POOLED RSLT: CPT | Performed by: OBSTETRICS & GYNECOLOGY

## 2024-06-26 PROCEDURE — G0145 SCR C/V CYTO,THINLAYER,RESCR: HCPCS | Performed by: OBSTETRICS & GYNECOLOGY

## 2024-06-26 RX ORDER — DEXTROAMPHETAMINE SACCHARATE, AMPHETAMINE ASPARTATE MONOHYDRATE, DEXTROAMPHETAMINE SULFATE AND AMPHETAMINE SULFATE 3.75; 3.75; 3.75; 3.75 MG/1; MG/1; MG/1; MG/1
15 CAPSULE, EXTENDED RELEASE ORAL
COMMUNITY
Start: 2024-05-29 | End: 2024-08-27

## 2024-06-26 RX ORDER — NORETHINDRONE ACETATE AND ETHINYL ESTRADIOL 1MG-20(21)
1 KIT ORAL DAILY
COMMUNITY
End: 2024-06-26

## 2024-06-26 RX ORDER — NORETHINDRONE ACETATE AND ETHINYL ESTRADIOL 1MG-20(21)
1 KIT ORAL DAILY
Qty: 90 TABLET | Refills: 3 | Status: SHIPPED | OUTPATIENT
Start: 2024-06-26

## 2024-06-27 LAB
HPV HR 12 DNA CVX QL NAA+PROBE: NEGATIVE
HPV16 DNA CVX QL NAA+PROBE: NEGATIVE
HPV18 DNA CVX QL NAA+PROBE: NEGATIVE
HUMAN PAPILLOMA VIRUS FINAL DIAGNOSIS: NORMAL

## 2024-07-02 LAB
BKR LAB AP GYN ADEQUACY: NORMAL
BKR LAB AP GYN INTERPRETATION: NORMAL
BKR LAB AP LMP: NORMAL
BKR LAB AP PREVIOUS ABNORMAL: NORMAL
PATH REPORT.COMMENTS IMP SPEC: NORMAL
PATH REPORT.COMMENTS IMP SPEC: NORMAL
PATH REPORT.RELEVANT HX SPEC: NORMAL

## 2024-07-13 ENCOUNTER — HEALTH MAINTENANCE LETTER (OUTPATIENT)
Age: 39
End: 2024-07-13

## 2024-11-30 ENCOUNTER — HEALTH MAINTENANCE LETTER (OUTPATIENT)
Age: 39
End: 2024-11-30

## 2025-03-09 ENCOUNTER — HEALTH MAINTENANCE LETTER (OUTPATIENT)
Age: 40
End: 2025-03-09

## 2025-06-28 ENCOUNTER — HEALTH MAINTENANCE LETTER (OUTPATIENT)
Age: 40
End: 2025-06-28

## 2025-07-17 DIAGNOSIS — N80.9 ENDOMETRIOSIS: ICD-10-CM

## 2025-07-17 RX ORDER — NORETHINDRONE ACETATE AND ETHINYL ESTRADIOL 1MG-20(21)
KIT ORAL
Qty: 112 TABLET | Refills: 0 | OUTPATIENT
Start: 2025-07-17

## 2025-07-17 NOTE — TELEPHONE ENCOUNTER
Requested Prescriptions   Pending Prescriptions Disp Refills    norethindrone-ethinyl estradiol (JUNEL FE 1/20) 1-20 MG-MCG tablet 112 tablet 0     Sig: TAKE 1 ACTIVE TABLET BY MOUTH DAILY FOR CONTINUOUS SUPPRESSION       Contraceptives Protocol Failed - 7/17/2025  2:33 PM        Failed - Recent (12 month) or future (90 days) visit with authorizing provider's specialty (provided they have been seen in the past 15 months)     The patient must have completed an in-person or virtual visit within the past 12 months or has a future visit scheduled within the next 90 days with the authorizing provider s specialty.  Urgent care and e-visits do not qualify as an office visit for this protocol.          Failed - Medication indicated for associated diagnosis     Medication is associated with one or more of the following diagnoses:  Contraception  Acne  Dysmenorrhea  Menorrhagia  Amenorrhea  PCOS  Premenstrual Dysphoric Disorder  Irregular menses  Endometriosis  Contraceptive counseling  Finding of menstrual bleeding  Education about oral contraception  Uses contraception  Initial prescription of oral contraception  Oral contraception-no problem  Oral contraceptive repeat          Passed - Patient is not a current smoker if age is 35 or older        Passed - Medication is active on med list and the sig matches. RN to manually verify dose and sig if red X/fail.     If the protocol passes (green check), you do not need to verify med dose and sig.    A prescription matches if they are the same clinical intention.    For Example: once daily and every morning are the same.    The protocol can not identify upper and lower case letters as matching and will fail.     For Example: Take 1 tablet (50 mg) by mouth daily     TAKE 1 TABLET (50 MG) BY MOUTH DAILY    For all fails (red x), verify dose and sig.    If the refill does match what is on file, the RN can still proceed to approve the refill request.       If they do not match, route  to the appropriate provider.             Passed - No active pregnancy on record        Passed - No positive pregnancy test in past 12 months           Last Written Prescription Date: 01/10/2023  Last Fill Quantity: 112,  # refills: 0   Last office visit: 6/26/2024 ; last virtual visit: Visit date not found with prescribing provider: Dr. Kasia Tan   Future Office Visit: no future appointment scheduled.

## 2025-07-17 NOTE — TELEPHONE ENCOUNTER
Pt due for annual, no appt scheduled. Pt already received one month extension. Rx denied.   Karina Falcon RN on 7/17/2025 at 2:45 PM

## 2025-08-09 ENCOUNTER — HEALTH MAINTENANCE LETTER (OUTPATIENT)
Age: 40
End: 2025-08-09

## (undated) DEVICE — ENDO SCOPE WARMER LF TM500

## (undated) DEVICE — TUBING HYDRO-SURG PLUS LAP IRRIGATOR SUCTION 0026870

## (undated) DEVICE — SU VICRYL 4-0 PS-2 18" UND J496H

## (undated) DEVICE — NDL 19GA 1.5"

## (undated) DEVICE — SOL WATER IRRIG 1000ML BOTTLE 2F7114

## (undated) DEVICE — GLOVE PROTEXIS W/NEU-THERA 8.0  2D73TE80

## (undated) DEVICE — Device

## (undated) DEVICE — SYSTEM CLEARIFY VISUALIZATION 21-345

## (undated) DEVICE — ESU HOLDER LAP INST DISP PURPLE LONG 330MM H-PRO-330

## (undated) DEVICE — SOL RINGERS LACTATED 1000ML BAG 2B2324X

## (undated) DEVICE — NDL INSUFFLATION 13GA 150MM C2202

## (undated) DEVICE — GLOVE PROTEXIS MICRO 7.5  2D73PM75

## (undated) DEVICE — CATH TRAY FOLEY 16FR BARDEX W/DRAIN BAG STATLOCK 300316A

## (undated) DEVICE — SYR 05ML LL W/O NDL

## (undated) DEVICE — NDL INSUFFLATION 13GA 120MM C2201

## (undated) DEVICE — SU VICRYL 0 CT-1 36" J346H

## (undated) DEVICE — EVAC SYSTEM CLEAR FLOW SC082500

## (undated) DEVICE — SYR 05ML SLIP TIP W/O NDL 309647

## (undated) DEVICE — LINEN TOWEL PACK X5 5464

## (undated) DEVICE — NDL SPINAL 22GA 7" QUINCKE 405149

## (undated) DEVICE — SOL NACL 0.9% INJ 250ML BAG 2B1322Q

## (undated) DEVICE — DEVICE SUTURE GRASPER TROCAR CLOSURE 14GA PMITCSG

## (undated) DEVICE — PREP SKIN SCRUB TRAY 4461A

## (undated) DEVICE — CATH TRAY FOLEY SURESTEP 16FR WDRAIN BAG STLK LATEX A300316A

## (undated) DEVICE — SYR 10ML FINGER CONTROL W/O NDL 309695

## (undated) DEVICE — SUCTION CANISTER MEDIVAC LINER 3000ML W/LID 65651-530

## (undated) RX ORDER — PROPOFOL 10 MG/ML
INJECTION, EMULSION INTRAVENOUS
Status: DISPENSED
Start: 2021-11-09

## (undated) RX ORDER — HYDROMORPHONE HYDROCHLORIDE 1 MG/ML
INJECTION, SOLUTION INTRAMUSCULAR; INTRAVENOUS; SUBCUTANEOUS
Status: DISPENSED
Start: 2019-04-30

## (undated) RX ORDER — PROPOFOL 10 MG/ML
INJECTION, EMULSION INTRAVENOUS
Status: DISPENSED
Start: 2019-04-30

## (undated) RX ORDER — HYDROMORPHONE HYDROCHLORIDE 1 MG/ML
INJECTION, SOLUTION INTRAMUSCULAR; INTRAVENOUS; SUBCUTANEOUS
Status: DISPENSED
Start: 2021-11-09

## (undated) RX ORDER — HYDROXYZINE HYDROCHLORIDE 50 MG/ML
INJECTION, SOLUTION INTRAMUSCULAR
Status: DISPENSED
Start: 2021-11-09

## (undated) RX ORDER — HYDROMORPHONE HCL IN WATER/PF 6 MG/30 ML
PATIENT CONTROLLED ANALGESIA SYRINGE INTRAVENOUS
Status: DISPENSED
Start: 2021-11-09

## (undated) RX ORDER — OXYCODONE AND ACETAMINOPHEN 5; 325 MG/1; MG/1
TABLET ORAL
Status: DISPENSED
Start: 2019-04-30

## (undated) RX ORDER — ACETAMINOPHEN 325 MG/1
TABLET ORAL
Status: DISPENSED
Start: 2021-11-09

## (undated) RX ORDER — ONDANSETRON 2 MG/ML
INJECTION INTRAMUSCULAR; INTRAVENOUS
Status: DISPENSED
Start: 2021-11-09

## (undated) RX ORDER — FENTANYL CITRATE 50 UG/ML
INJECTION, SOLUTION INTRAMUSCULAR; INTRAVENOUS
Status: DISPENSED
Start: 2019-04-30

## (undated) RX ORDER — OXYCODONE HYDROCHLORIDE 5 MG/1
TABLET ORAL
Status: DISPENSED
Start: 2021-11-09

## (undated) RX ORDER — ONDANSETRON 2 MG/ML
INJECTION INTRAMUSCULAR; INTRAVENOUS
Status: DISPENSED
Start: 2019-04-30

## (undated) RX ORDER — DEXMEDETOMIDINE HYDROCHLORIDE 4 UG/ML
INJECTION, SOLUTION INTRAVENOUS
Status: DISPENSED
Start: 2021-11-09

## (undated) RX ORDER — KETOROLAC TROMETHAMINE 30 MG/ML
INJECTION, SOLUTION INTRAMUSCULAR; INTRAVENOUS
Status: DISPENSED
Start: 2019-04-30

## (undated) RX ORDER — DEXAMETHASONE SODIUM PHOSPHATE 4 MG/ML
INJECTION, SOLUTION INTRA-ARTICULAR; INTRALESIONAL; INTRAMUSCULAR; INTRAVENOUS; SOFT TISSUE
Status: DISPENSED
Start: 2019-04-30

## (undated) RX ORDER — GLYCOPYRROLATE 0.2 MG/ML
INJECTION, SOLUTION INTRAMUSCULAR; INTRAVENOUS
Status: DISPENSED
Start: 2019-04-30

## (undated) RX ORDER — LIDOCAINE HYDROCHLORIDE 20 MG/ML
INJECTION, SOLUTION EPIDURAL; INFILTRATION; INTRACAUDAL; PERINEURAL
Status: DISPENSED
Start: 2021-11-09

## (undated) RX ORDER — NEOSTIGMINE METHYLSULFATE 1 MG/ML
VIAL (ML) INJECTION
Status: DISPENSED
Start: 2019-04-30

## (undated) RX ORDER — KETOROLAC TROMETHAMINE 30 MG/ML
INJECTION, SOLUTION INTRAMUSCULAR; INTRAVENOUS
Status: DISPENSED
Start: 2021-11-09

## (undated) RX ORDER — FENTANYL CITRATE 0.05 MG/ML
INJECTION, SOLUTION INTRAMUSCULAR; INTRAVENOUS
Status: DISPENSED
Start: 2021-11-09

## (undated) RX ORDER — ACETAMINOPHEN 500 MG
TABLET ORAL
Status: DISPENSED
Start: 2019-04-30

## (undated) RX ORDER — LIDOCAINE HYDROCHLORIDE 20 MG/ML
INJECTION, SOLUTION EPIDURAL; INFILTRATION; INTRACAUDAL; PERINEURAL
Status: DISPENSED
Start: 2019-04-30

## (undated) RX ORDER — CYANOCOBALAMIN 1000 UG/ML
INJECTION, SOLUTION INTRAMUSCULAR; SUBCUTANEOUS
Status: DISPENSED
Start: 2022-02-04

## (undated) RX ORDER — KETOROLAC TROMETHAMINE 15 MG/ML
INJECTION, SOLUTION INTRAMUSCULAR; INTRAVENOUS
Status: DISPENSED
Start: 2019-04-30

## (undated) RX ORDER — FENTANYL CITRATE 50 UG/ML
INJECTION, SOLUTION INTRAMUSCULAR; INTRAVENOUS
Status: DISPENSED
Start: 2021-11-09